# Patient Record
Sex: FEMALE | Race: WHITE | NOT HISPANIC OR LATINO | Employment: FULL TIME | URBAN - METROPOLITAN AREA
[De-identification: names, ages, dates, MRNs, and addresses within clinical notes are randomized per-mention and may not be internally consistent; named-entity substitution may affect disease eponyms.]

---

## 2017-09-29 ENCOUNTER — APPOINTMENT (OUTPATIENT)
Dept: WOMENS IMAGING | Facility: HOSPITAL | Age: 37
End: 2017-09-29

## 2017-09-29 PROCEDURE — 77067 SCR MAMMO BI INCL CAD: CPT | Performed by: RADIOLOGY

## 2017-09-29 PROCEDURE — 77063 BREAST TOMOSYNTHESIS BI: CPT | Performed by: RADIOLOGY

## 2019-02-15 ENCOUNTER — APPOINTMENT (OUTPATIENT)
Dept: WOMENS IMAGING | Facility: HOSPITAL | Age: 39
End: 2019-02-15

## 2019-02-15 PROCEDURE — 77067 SCR MAMMO BI INCL CAD: CPT | Performed by: RADIOLOGY

## 2019-02-15 PROCEDURE — 77063 BREAST TOMOSYNTHESIS BI: CPT | Performed by: RADIOLOGY

## 2020-06-05 ENCOUNTER — APPOINTMENT (OUTPATIENT)
Dept: WOMENS IMAGING | Facility: HOSPITAL | Age: 40
End: 2020-06-05

## 2020-06-05 PROCEDURE — 77066 DX MAMMO INCL CAD BI: CPT | Performed by: RADIOLOGY

## 2020-06-05 PROCEDURE — 76641 ULTRASOUND BREAST COMPLETE: CPT | Performed by: RADIOLOGY

## 2020-06-05 PROCEDURE — 77062 BREAST TOMOSYNTHESIS BI: CPT | Performed by: RADIOLOGY

## 2020-06-12 ENCOUNTER — APPOINTMENT (OUTPATIENT)
Dept: WOMENS IMAGING | Facility: HOSPITAL | Age: 40
End: 2020-06-12

## 2020-06-12 PROCEDURE — 19000 PUNCTURE ASPIR CYST BREAST: CPT | Performed by: RADIOLOGY

## 2020-06-12 PROCEDURE — 19083 BX BREAST 1ST LESION US IMAG: CPT | Performed by: RADIOLOGY

## 2020-06-12 PROCEDURE — 76942 ECHO GUIDE FOR BIOPSY: CPT

## 2020-06-18 DIAGNOSIS — C50.919 MALIGNANT NEOPLASM OF FEMALE BREAST, UNSPECIFIED ESTROGEN RECEPTOR STATUS, UNSPECIFIED LATERALITY, UNSPECIFIED SITE OF BREAST (HCC): Primary | ICD-10-CM

## 2020-06-19 ENCOUNTER — OFFICE VISIT (OUTPATIENT)
Dept: MAMMOGRAPHY | Facility: CLINIC | Age: 40
End: 2020-06-19

## 2020-06-19 ENCOUNTER — TRANSCRIBE ORDERS (OUTPATIENT)
Dept: MAMMOGRAPHY | Facility: CLINIC | Age: 40
End: 2020-06-19

## 2020-06-19 VITALS
WEIGHT: 156 LBS | DIASTOLIC BLOOD PRESSURE: 70 MMHG | HEIGHT: 66 IN | BODY MASS INDEX: 25.07 KG/M2 | SYSTOLIC BLOOD PRESSURE: 122 MMHG

## 2020-06-19 DIAGNOSIS — Z17.0 MALIGNANT NEOPLASM OF UPPER-OUTER QUADRANT OF LEFT BREAST IN FEMALE, ESTROGEN RECEPTOR POSITIVE (HCC): Primary | ICD-10-CM

## 2020-06-19 DIAGNOSIS — C50.412 MALIGNANT NEOPLASM OF UPPER-OUTER QUADRANT OF LEFT BREAST IN FEMALE, ESTROGEN RECEPTOR POSITIVE (HCC): Primary | ICD-10-CM

## 2020-06-19 PROCEDURE — 99205 OFFICE O/P NEW HI 60 MIN: CPT | Performed by: SURGERY

## 2020-06-19 RX ORDER — UBIDECARENONE 75 MG
50 CAPSULE ORAL DAILY
COMMUNITY
End: 2020-07-29

## 2020-06-19 RX ORDER — ERGOCALCIFEROL (VITAMIN D2) 10 MCG
400 TABLET ORAL DAILY
COMMUNITY
End: 2020-07-29

## 2020-06-19 RX ORDER — IBUPROFEN 400 MG/1
400 TABLET ORAL EVERY 6 HOURS PRN
COMMUNITY
End: 2020-08-07 | Stop reason: HOSPADM

## 2020-06-19 RX ORDER — CHLORAL HYDRATE 500 MG
1000 CAPSULE ORAL
COMMUNITY
End: 2020-08-07 | Stop reason: HOSPADM

## 2020-06-19 NOTE — PROGRESS NOTES
Chief Complaint: Tiera Grove is a 40 y.o.. female here today for Breast Cancer        History of Present Illness:  Patient presents with newly diagnosed breast cancer. Left IDC  She is a very nice 40-year-old white female who about 3 months ago noticed a mass in the upper outer quadrant of the left breast.  She has had some nodularity in this area and actually had a biopsy 6 years ago that was benign.  She was unable to get imaging during the COVID situation but eventually had a diagnostic mammogram performed in June.  There was an irregular mass measuring 22 mm in the posterior one third of the left breast at the 2 o'clock position.  There were also some stable punctate calcifications in the upper outer region of the left breast.  She then had ultrasound which revealed an irregular solid mass measuring 28 mm in the posterior one third of the left breast at the 2 o'clock position.  Note was also made of a small solid mass measuring 7 mm at the 3 o'clock position of the left breast.  The axilla was imaged and did not show any abnormalities.  Biopsy was recommended for the 2:00 and 3:00 masses.  At the 3 o'clock position she was found to have benign fibrocystic change.  The 2 o'clock position revealed grade 3 invasive ductal cancer with some focal associated high-grade DCIS.  The tumor was ER positive at 78%, WV positive at 98%, HER-2 negative, and the Ki-67 was 58%.    Her family history is significant for 2 maternal cousins with breast cancer in their 40s.  A maternal aunt also had breast cancer at 70.  She also has a paternal aunt who had breast cancer in her 60s.  There is a history of colon cancer in a maternal uncle and melanoma in a maternal aunt.  The patient does have a Mirena in place.    Review of Systems:  Review of Systems   Skin:        The patient denies any noticeable changes to the skin of the breast.    All other systems reviewed and are negative.     I have reviewed the ROS as documented by the  MA/LPN/RN iDrk Fenton MD      Past Medical and Surgical History:  Breast Biopsy History:  Patient has had the following breast biopsies:2014 Left-benign 2020 Left-malignant  Breast Cancer HIstory:  Patient does not have a past medical history of breast cancer.  Breast Operations, and year:  None  Social History     Tobacco Use   Smoking Status Never Smoker     Obstetric History:  Patient is premenopausal, first day of last period: Has an IUD and does not menstruate.   Number of pregnancies:2  Number of live births: 2  Number of abortions or miscarriages: 0  Age of delivery of first child: 22  Patient breast fed, for the following lenth of time: 5 weeks  Length of time taking birth control pills:5 years  Patient has never taken hormone replacement    Past Surgical History:   Procedure Laterality Date   • BREAST BIOPSY Left 2014    benign   • BREAST BIOPSY Left 06/2020    malignant       Past Medical History:   Diagnosis Date   • Breast cancer (CMS/HCC) 06/2020    Left IDC       Prior Hospitalizations, other than for surgery or childbirth, and year:  None    Social History:  Patient is .  Patient has one daughters. and Patient has one sons.    Family History:  Family History   Problem Relation Age of Onset   • Hearing loss Mother    • Hyperlipidemia Mother    • Alcohol abuse Father    • Deep vein thrombosis Father    • Hyperlipidemia Father    • Hypertension Father    • Melanoma Maternal Aunt    • Breast cancer Maternal Aunt    • Cancer Maternal Aunt         Adrenal glands   • Asthma Maternal Uncle    • Melanoma Maternal Uncle    • Colon cancer Maternal Uncle    • Breast cancer Paternal Aunt    • Heart failure Maternal Grandmother    • Depression Maternal Grandfather    • Heart failure Maternal Grandfather    • Birth defects Nephew    • Breast cancer Maternal Cousin    • Breast cancer Maternal Cousin        Vital Signs:  Vitals:    06/19/20 0905   BP: 122/70       Medications:    Current Outpatient  Prescriptions:     Current Outpatient Medications:   •  ibuprofen (ADVIL,MOTRIN) 400 MG tablet, Take 400 mg by mouth Every 6 (Six) Hours As Needed for Mild Pain ., Disp: , Rfl:   •  Omega-3 Fatty Acids (FISH OIL) 1000 MG capsule capsule, Take  by mouth Daily With Breakfast., Disp: , Rfl:   •  vitamin B-12 (CYANOCOBALAMIN) 100 MCG tablet, Take 50 mcg by mouth Daily., Disp: , Rfl:   •  Vitamin D, Cholecalciferol, (CHOLECALCIFEROL) 10 MCG (400 UNIT) tablet, Take 400 Units by mouth Daily., Disp: , Rfl:     Physical Examination:  General Appearance:   Patient is in no distress.  She is well kept and has an average build.   Psychiatric:  Patient with appropriate mood and affect. Alert and oriented to self, time, and place.    Breast, RIGHT:  small sized, symmetric with the contralateral side and tubular in shape.  Breast skin is without erythema, edema, rashes.  There are no visible abnormalities upon inspection during the arm-raising maneuver or with hands on hips in the sitting position. There is no nipple retraction, discharge or nipple/areolar skin changes.There are no masses palpable in the sitting or supine positions.    Breast, LEFT:  small sized, symmetric with the contralateral side and tubular in shape.  Breast skin is without erythema, edema, rashes.  There are no visible abnormalities upon inspection during the arm-raising maneuver or with hands on hips in the sitting position. There is no nipple retraction, discharge or nipple/areolar skin changes.There is an irregular mass in the upper outer quadrant which is very ill-defined but to my exam would appear to measure about 3.5 cm in greatest dimension.  It is mobile from the deeper structures.  She does have a small biopsy scar nearby.    Lymphatic:  There is  cervical, infraclavicular, or supraclavicular adenopathy bilaterally.  I can feel a mobile slightly firm left axillary lymph node which may be reactive.  Eyes:  Pupils are round and reactive to  light.  Cardiovascular:  Heart rate and rhythm are regular.  Respiratory:  Lungs are clear bilaterally with no crackles or wheezes in any lung field.  Gastrointestinal:  Abdomen is soft, nondistended, and nontender.  There was no obvious hepatosplenomegaly or abdominal mass.  There are no scars from previous surgery.    Musculoskeletal:  Good strength in all 4 extremities.   There is good range of motion in both shoulders.    Skin:  No new skin lesions or rashes on the skin excluding the breast (see breast exam above).    Cancer Staging  Primary Tumor: T2  Regional Lymph Nodes:  N0  Distant Mets: M0  Clinical Stage Group: IIA    Assessment:  1. Malignant neoplasm of upper-outer quadrant of left breast in female, estrogen receptor positive (CMS/HCC)          Plan:  She was accompanied today by her  and mother-in-law.  The office visit lasted 1 hour and 5 minutes with 50 minutes spent in face-to-face consultation.    We began the conversation discussing her pathology report.  We talked about the origin of most of breast cancers from either the ducts or the lobules.  The difference between invasive and in situ disease was explained and the visual was drawn for her.  When invasion has occurred, the lymph nodes need to be evaluated.  When there is in situ disease the lymph nodes should be considered if the area of concern is widespread or it is high-grade.  We also discussed the significance of hormone receptors.  They often can give us some idea how the breast cancer will behave and potentially lead us to offer neoadjuvant chemotherapy.    Next we discussed the surgical options which include breast conserving therapy versus a mastectomy.  With breast conserving therapy we are talking about a lumpectomy with margins, lymph node evaluation, and radiation treatment.  The radiation treatment is generally given to the entire breast for 6 weeks.  The side effects consist of local skin change and potential injury to  nearby structures such as the lung or the heart.  A mastectomy would involve removing the breast tissues with preservation of the pectoral muscles and evaluation of the lymph nodes if indicated.  The potential for reconstruction by either an implant or autologous tissue was discussed.  This could be performed on a delayed basis or immediately depending on a multitude of factors.  The survival rates for these 2 procedures are equivalent but there are incidences where one may be favored over the other.  There are times when a lumpectomy is not possible due to the large tumor to breast ratio or the location of the tumor.  Previous radiation to the chest wall or collagen disorders such as scleroderma would make radiation treatment and possible and therefore exclude breast conserving therapy as an option.    It is difficult to know exactly how big this tumor is.  To my examination is significantly bigger than the imaging studies would suggest.  For that reason I think an MRI would be helpful and we are in the process of setting that up.  She also is a candidate for genetic testing and we meghna the stat breast panel in the office today.  She is rather small breasted and I am fearful she will not be a very good lumpectomy candidate but of course we will await the MRI as well as the genetics report which could alter our decision making.  CPT coding:    Next Appointment:  No follow-ups on file.            EMR Dragon/transcription disclaimer:    Much of this encounter note is an electronic transcription/translocation of spoken language to printed text.  The electronic translation of spoken language may permit erroneous, or at times, nonsensical words or phrases to be inadvertently transcribed.  Although I have reviewed the note from such areas, some may still exist.

## 2020-06-23 ENCOUNTER — TELEPHONE (OUTPATIENT)
Dept: MAMMOGRAPHY | Facility: CLINIC | Age: 40
End: 2020-06-23

## 2020-06-23 NOTE — TELEPHONE ENCOUNTER
----- Message from Henrietta Yo MA sent at 6/22/2020  5:40 PM EDT -----  Regarding: FW: Visit Follow-Up Question  This came through her MyChart - she is having a little melt down, you may need to call her    Henrietta    ----- Message -----  From: Tiera Grove  Sent: 6/22/2020   4:31 PM EDT  To: [facility] Clinical Pool  Subject: Visit Follow-Up Question                         I have just been feeling anxious and would really like to get the cancer out of my body. I also have been just considering just going ahead and having a double mastectomy. Question is, are you awaiting the results from mri and genetic testing, only to help decide if the right one needs treatment as well, or do u need both of those tests before you do surgery on the left as well? I would like to get surgery scheduled ASAP and also I would really appreciate doing a PET scan to ensure the cancer hasn’t went anywhere else in my body.   You can call me or message me   [phone]   [email or web address]    I spoke with her today and she actually has decided upon bilateral mastectomies.  She would like to speak with the plastic surgeons and I have placed orders for that.

## 2020-06-23 NOTE — TELEPHONE ENCOUNTER
Henrietta, I spoke with her today and she would like to proceed with bilateral mastectomies.  The MRI is not probably as important in that setting but if it works out we will get it.  We need to get her to see the plastic surgeons and I have placed orders for that.

## 2020-06-23 NOTE — TELEPHONE ENCOUNTER
Ms. Grove called and is very concerned about waiting to have the MRI and Genetics that her surgery will spread. She would like to speak with you about just moving forward with a bilateral mastectomy with reconstruction.    I did review with her the names of the plastic surgeons we use and she was not familiar with any of them.    Please call her at [phone]

## 2020-06-24 ENCOUNTER — TELEPHONE (OUTPATIENT)
Dept: OTHER | Facility: HOSPITAL | Age: 40
End: 2020-06-24

## 2020-06-24 NOTE — TELEPHONE ENCOUNTER
Referral received from Dr. Fenton's office. I called Tiera and introduced myself and navigational services. She states the plan is to have a MRI and genetic testing prior to making a surgery decision. She did state however that she is leaning toward a bilateral mastectomy with reconstruction. She verbalizes her primary concern is waiting for surgery, but she understands the need to schedule testing and consults prior to surgery. She has a good understanding of her pathology and treatment options and  is comfortable with her plan of care.     We also discussed genetic testing and needing a consult with Dr. Nguyen for genetic counseling. She had verbal understanding and that appointment was made.     She stated she has a good support system at home and is doing well at this time. We discussed that we have counseling services available through our [facility] if she should need it in the future. She was thankful for the information and declined the need for that at present.    We discussed integrative therapies and other services at the [facility]. She verbalized interest in receiving a navigation folder outlining services. I verified her mailing address and will send out a navigation folder with the following information:     [facility],  Sharing Our Stories Breast Cancer Support Group, Cancer and Restorative Exercise (CARE), [employer] Exercise program, Together for Breast Cancer Survival,  For Women Facing Breast Cancer, [employer], [facility], Massage Therapy, Reiki Therapy, [employer] [place], Cancer Nutrition, and Survivorship Clinic.     She verbalized appreciation for navigational services and she has my contact information and will call with any questions that arise.

## 2020-06-29 ENCOUNTER — TELEPHONE (OUTPATIENT)
Dept: MAMMOGRAPHY | Facility: CLINIC | Age: 40
End: 2020-06-29

## 2020-06-29 NOTE — TELEPHONE ENCOUNTER
I told her the genetics testing was negative.  She has her plastic surgery consult and an MRI coming up on July 8.

## 2020-07-07 ENCOUNTER — APPOINTMENT (OUTPATIENT)
Dept: MRI IMAGING | Facility: HOSPITAL | Age: 40
End: 2020-07-07

## 2020-07-08 ENCOUNTER — HOSPITAL ENCOUNTER (OUTPATIENT)
Dept: MRI IMAGING | Facility: HOSPITAL | Age: 40
Discharge: HOME | End: 2020-07-08
Admitting: SURGERY

## 2020-07-08 DIAGNOSIS — C50.412 MALIGNANT NEOPLASM OF UPPER-OUTER QUADRANT OF LEFT BREAST IN FEMALE, ESTROGEN RECEPTOR POSITIVE (HCC): ICD-10-CM

## 2020-07-08 DIAGNOSIS — Z17.0 MALIGNANT NEOPLASM OF UPPER-OUTER QUADRANT OF LEFT BREAST IN FEMALE, ESTROGEN RECEPTOR POSITIVE (HCC): ICD-10-CM

## 2020-07-08 PROCEDURE — A9577 INJ MULTIHANCE: HCPCS | Performed by: SURGERY

## 2020-07-08 PROCEDURE — 77049 MRI BREAST C-+ W/CAD BI: CPT

## 2020-07-08 PROCEDURE — 0 GADOBENATE DIMEGLUMINE 529 MG/ML SOLUTION: Performed by: SURGERY

## 2020-07-08 RX ADMIN — GADOBENATE DIMEGLUMINE 15 ML: 529 INJECTION, SOLUTION INTRAVENOUS at 18:30

## 2020-07-10 ENCOUNTER — TELEPHONE (OUTPATIENT)
Dept: MAMMOGRAPHY | Facility: CLINIC | Age: 40
End: 2020-07-10

## 2020-07-10 NOTE — TELEPHONE ENCOUNTER
I spoke with her today regarding her MRI.  There does appear to be some encroachment on the pectoral fascia and may be a tiny area of pectoralis involvement.  She would probably be better off with the implant underneath the muscle and I will speak with the plastic surgeons regarding that.

## 2020-07-17 ENCOUNTER — PREP FOR SURGERY (OUTPATIENT)
Dept: OTHER | Facility: HOSPITAL | Age: 40
End: 2020-07-17

## 2020-07-17 DIAGNOSIS — C50.412 MALIGNANT NEOPLASM OF UPPER-OUTER QUADRANT OF LEFT BREAST IN FEMALE, ESTROGEN RECEPTOR POSITIVE (HCC): Primary | ICD-10-CM

## 2020-07-17 DIAGNOSIS — Z17.0 MALIGNANT NEOPLASM OF UPPER-OUTER QUADRANT OF LEFT BREAST IN FEMALE, ESTROGEN RECEPTOR POSITIVE (HCC): Primary | ICD-10-CM

## 2020-07-17 RX ORDER — CEFAZOLIN SODIUM 2 G/100ML
2 INJECTION, SOLUTION INTRAVENOUS ONCE
Status: CANCELLED | OUTPATIENT
Start: 2020-08-06 | End: 2020-07-17

## 2020-07-17 RX ORDER — DIAZEPAM 5 MG/1
10 TABLET ORAL ONCE
Status: CANCELLED | OUTPATIENT
Start: 2020-08-06 | End: 2020-07-17

## 2020-07-17 RX ORDER — CELECOXIB 200 MG/1
400 CAPSULE ORAL ONCE
Status: CANCELLED | OUTPATIENT
Start: 2020-08-06 | End: 2020-07-17

## 2020-07-17 RX ORDER — ACETAMINOPHEN 500 MG
1000 TABLET ORAL ONCE
Status: CANCELLED | OUTPATIENT
Start: 2020-08-06 | End: 2020-07-17

## 2020-07-17 RX ORDER — LIDOCAINE AND PRILOCAINE 25; 25 MG/G; MG/G
CREAM TOPICAL ONCE
Status: CANCELLED | OUTPATIENT
Start: 2020-08-06 | End: 2020-07-17

## 2020-07-20 ENCOUNTER — TELEPHONE (OUTPATIENT)
Dept: MAMMOGRAPHY | Facility: CLINIC | Age: 40
End: 2020-07-20

## 2020-07-20 PROBLEM — Z17.0 MALIGNANT NEOPLASM OF UPPER-OUTER QUADRANT OF LEFT BREAST IN FEMALE, ESTROGEN RECEPTOR POSITIVE: Status: ACTIVE | Noted: 2020-07-20

## 2020-07-20 PROBLEM — C50.412 MALIGNANT NEOPLASM OF UPPER-OUTER QUADRANT OF LEFT BREAST IN FEMALE, ESTROGEN RECEPTOR POSITIVE (HCC): Status: ACTIVE | Noted: 2020-07-20

## 2020-07-20 NOTE — TELEPHONE ENCOUNTER
Left voicemail for patient. Mailed and scanned in [facility] patient surgery information sheet.     PAT is scheduled for 7/29 at 1 pm -  Requested patient call with questions

## 2020-07-27 ENCOUNTER — TRANSCRIBE ORDERS (OUTPATIENT)
Dept: PREADMISSION TESTING | Facility: HOSPITAL | Age: 40
End: 2020-07-27

## 2020-07-27 DIAGNOSIS — Z01.818 OTHER SPECIFIED PRE-OPERATIVE EXAMINATION: Primary | ICD-10-CM

## 2020-07-29 ENCOUNTER — APPOINTMENT (OUTPATIENT)
Dept: PREADMISSION TESTING | Facility: HOSPITAL | Age: 40
End: 2020-07-29

## 2020-07-29 VITALS
SYSTOLIC BLOOD PRESSURE: 112 MMHG | DIASTOLIC BLOOD PRESSURE: 72 MMHG | WEIGHT: 161.5 LBS | OXYGEN SATURATION: 100 % | RESPIRATION RATE: 16 BRPM | HEART RATE: 75 BPM | BODY MASS INDEX: 25.96 KG/M2 | TEMPERATURE: 97.4 F | HEIGHT: 66 IN

## 2020-07-29 DIAGNOSIS — Z17.0 MALIGNANT NEOPLASM OF UPPER-OUTER QUADRANT OF LEFT BREAST IN FEMALE, ESTROGEN RECEPTOR POSITIVE (HCC): ICD-10-CM

## 2020-07-29 DIAGNOSIS — C50.412 MALIGNANT NEOPLASM OF UPPER-OUTER QUADRANT OF LEFT BREAST IN FEMALE, ESTROGEN RECEPTOR POSITIVE (HCC): ICD-10-CM

## 2020-07-29 LAB
ALBUMIN SERPL-MCNC: 4.2 G/DL (ref 3.5–5.2)
ALBUMIN/GLOB SERPL: 1.5 G/DL
ALP SERPL-CCNC: 73 U/L (ref 39–117)
ALT SERPL W P-5'-P-CCNC: 20 U/L (ref 1–33)
ANION GAP SERPL CALCULATED.3IONS-SCNC: 7.4 MMOL/L (ref 5–15)
AST SERPL-CCNC: 18 U/L (ref 1–32)
BILIRUB SERPL-MCNC: 0.3 MG/DL (ref 0–1.2)
BUN SERPL-MCNC: 9 MG/DL (ref 6–20)
BUN/CREAT SERPL: 14.3 (ref 7–25)
CALCIUM SPEC-SCNC: 9.5 MG/DL (ref 8.6–10.5)
CHLORIDE SERPL-SCNC: 97 MMOL/L (ref 98–107)
CO2 SERPL-SCNC: 28.6 MMOL/L (ref 22–29)
CREAT SERPL-MCNC: 0.63 MG/DL (ref 0.57–1)
DEPRECATED RDW RBC AUTO: 42.1 FL (ref 37–54)
ERYTHROCYTE [DISTWIDTH] IN BLOOD BY AUTOMATED COUNT: 12 % (ref 12.3–15.4)
GFR SERPL CREATININE-BSD FRML MDRD: 104 ML/MIN/1.73
GLOBULIN UR ELPH-MCNC: 2.8 GM/DL
GLUCOSE SERPL-MCNC: 93 MG/DL (ref 65–99)
HCT VFR BLD AUTO: 40 % (ref 34–46.6)
HGB BLD-MCNC: 13.2 G/DL (ref 12–15.9)
MCH RBC QN AUTO: 31.3 PG (ref 26.6–33)
MCHC RBC AUTO-ENTMCNC: 33 G/DL (ref 31.5–35.7)
MCV RBC AUTO: 94.8 FL (ref 79–97)
PLATELET # BLD AUTO: 353 10*3/MM3 (ref 140–450)
PMV BLD AUTO: 9.2 FL (ref 6–12)
POTASSIUM SERPL-SCNC: 4 MMOL/L (ref 3.5–5.2)
PROT SERPL-MCNC: 7 G/DL (ref 6–8.5)
RBC # BLD AUTO: 4.22 10*6/MM3 (ref 3.77–5.28)
SODIUM SERPL-SCNC: 133 MMOL/L (ref 136–145)
WBC # BLD AUTO: 8.37 10*3/MM3 (ref 3.4–10.8)

## 2020-07-29 PROCEDURE — 80053 COMPREHEN METABOLIC PANEL: CPT | Performed by: SURGERY

## 2020-07-29 PROCEDURE — 85027 COMPLETE CBC AUTOMATED: CPT | Performed by: SURGERY

## 2020-07-29 PROCEDURE — 36415 COLL VENOUS BLD VENIPUNCTURE: CPT

## 2020-07-29 RX ORDER — LANOLIN ALCOHOL/MO/W.PET/CERES
1000 CREAM (GRAM) TOPICAL DAILY
COMMUNITY
End: 2020-09-08

## 2020-07-29 RX ORDER — ERGOCALCIFEROL 1.25 MG/1
50000 CAPSULE ORAL WEEKLY
COMMUNITY
End: 2021-05-07

## 2020-07-30 ENCOUNTER — TELEPHONE (OUTPATIENT)
Dept: MAMMOGRAPHY | Facility: CLINIC | Age: 40
End: 2020-07-30

## 2020-07-30 NOTE — TELEPHONE ENCOUNTER
Tiera Bah called to request her  spend the night with her at the hospital. I explained that would be decided by the hospital - she spoke with them and they told her if you requested it through her chart they could allow it and send it to risk management.    She would like you to do put a request / order in stating her  should be allowed to stay with her overnight. Please advise

## 2020-07-31 NOTE — TELEPHONE ENCOUNTER
This request has been approved through RISK management.  Pts  can have an overnight stay with the pt due to travel needs. [facility] [address] notified as well as screening booths.  Pt notified.     CMA

## 2020-08-03 DIAGNOSIS — Z17.0 MALIGNANT NEOPLASM OF UPPER-OUTER QUADRANT OF LEFT BREAST IN FEMALE, ESTROGEN RECEPTOR POSITIVE (HCC): Primary | ICD-10-CM

## 2020-08-03 DIAGNOSIS — C50.412 MALIGNANT NEOPLASM OF UPPER-OUTER QUADRANT OF LEFT BREAST IN FEMALE, ESTROGEN RECEPTOR POSITIVE (HCC): Primary | ICD-10-CM

## 2020-08-04 ENCOUNTER — LAB (OUTPATIENT)
Dept: LAB | Facility: HOSPITAL | Age: 40
End: 2020-08-04

## 2020-08-04 ENCOUNTER — APPOINTMENT (OUTPATIENT)
Dept: LAB | Facility: HOSPITAL | Age: 40
End: 2020-08-04

## 2020-08-04 DIAGNOSIS — Z01.818 OTHER SPECIFIED PRE-OPERATIVE EXAMINATION: ICD-10-CM

## 2020-08-04 PROCEDURE — C9803 HOPD COVID-19 SPEC COLLECT: HCPCS

## 2020-08-04 PROCEDURE — U0004 COV-19 TEST NON-CDC HGH THRU: HCPCS

## 2020-08-05 LAB
REF LAB TEST METHOD: NORMAL
SARS-COV-2 RNA RESP QL NAA+PROBE: NOT DETECTED

## 2020-08-06 ENCOUNTER — HOSPITAL ENCOUNTER (OUTPATIENT)
Dept: NUCLEAR MEDICINE | Facility: HOSPITAL | Age: 40
Discharge: HOME | End: 2020-08-06

## 2020-08-06 ENCOUNTER — HOSPITAL ENCOUNTER (OUTPATIENT)
Facility: HOSPITAL | Age: 40
Discharge: HOME | End: 2020-08-07
Attending: SURGERY | Admitting: SURGERY

## 2020-08-06 ENCOUNTER — ANESTHESIA EVENT (OUTPATIENT)
Dept: PERIOP | Facility: HOSPITAL | Age: 40
End: 2020-08-06

## 2020-08-06 ENCOUNTER — ANESTHESIA (OUTPATIENT)
Dept: PERIOP | Facility: HOSPITAL | Age: 40
End: 2020-08-06

## 2020-08-06 DIAGNOSIS — Z17.0 MALIGNANT NEOPLASM OF UPPER-OUTER QUADRANT OF LEFT BREAST IN FEMALE, ESTROGEN RECEPTOR POSITIVE (HCC): ICD-10-CM

## 2020-08-06 DIAGNOSIS — C50.412 MALIGNANT NEOPLASM OF UPPER-OUTER QUADRANT OF LEFT BREAST IN FEMALE, ESTROGEN RECEPTOR POSITIVE (HCC): ICD-10-CM

## 2020-08-06 LAB
B-HCG UR QL: NEGATIVE
INTERNAL NEGATIVE CONTROL: NEGATIVE
INTERNAL POSITIVE CONTROL: POSITIVE
Lab: NORMAL

## 2020-08-06 PROCEDURE — 38900 IO MAP OF SENT LYMPH NODE: CPT | Performed by: SURGERY

## 2020-08-06 PROCEDURE — 88331 PATH CONSLTJ SURG 1 BLK 1SPC: CPT | Performed by: SURGERY

## 2020-08-06 PROCEDURE — G0378 HOSPITAL OBSERVATION PER HR: HCPCS

## 2020-08-06 PROCEDURE — 25010000002 DEXAMETHASONE PER 1 MG: Performed by: NURSE ANESTHETIST, CERTIFIED REGISTERED

## 2020-08-06 PROCEDURE — 88302 TISSUE EXAM BY PATHOLOGIST: CPT | Performed by: SURGERY

## 2020-08-06 PROCEDURE — 0 TECHNETIUM FILTERED SULFUR COLLOID: Performed by: SURGERY

## 2020-08-06 PROCEDURE — 63710000001 ONDANSETRON PER 8 MG: Performed by: PLASTIC SURGERY

## 2020-08-06 PROCEDURE — 88307 TISSUE EXAM BY PATHOLOGIST: CPT | Performed by: SURGERY

## 2020-08-06 PROCEDURE — 25010000002 PHENYLEPHRINE PER 1 ML: Performed by: NURSE ANESTHETIST, CERTIFIED REGISTERED

## 2020-08-06 PROCEDURE — 19307 MAST MOD RAD: CPT | Performed by: REGISTERED NURSE

## 2020-08-06 PROCEDURE — 25010000003 CEFAZOLIN IN DEXTROSE 2-4 GM/100ML-% SOLUTION: Performed by: SURGERY

## 2020-08-06 PROCEDURE — 88332 PATH CONSLTJ SURG EA ADD BLK: CPT | Performed by: SURGERY

## 2020-08-06 PROCEDURE — 19307 MAST MOD RAD: CPT | Performed by: SURGERY

## 2020-08-06 PROCEDURE — C1789 PROSTHESIS, BREAST, IMP: HCPCS | Performed by: SURGERY

## 2020-08-06 PROCEDURE — 25010000002 GENTAMICIN PER 80 MG: Performed by: PLASTIC SURGERY

## 2020-08-06 PROCEDURE — 19303 MAST SIMPLE COMPLETE: CPT | Performed by: REGISTERED NURSE

## 2020-08-06 PROCEDURE — 25010000003 CEFAZOLIN PER 500 MG: Performed by: PLASTIC SURGERY

## 2020-08-06 PROCEDURE — 81025 URINE PREGNANCY TEST: CPT | Performed by: ANESTHESIOLOGY

## 2020-08-06 PROCEDURE — 25010000002 PROPOFOL 10 MG/ML EMULSION: Performed by: NURSE ANESTHETIST, CERTIFIED REGISTERED

## 2020-08-06 PROCEDURE — 19303 MAST SIMPLE COMPLETE: CPT | Performed by: SURGERY

## 2020-08-06 PROCEDURE — A9541 TC99M SULFUR COLLOID: HCPCS | Performed by: SURGERY

## 2020-08-06 PROCEDURE — 38792 RA TRACER ID OF SENTINL NODE: CPT

## 2020-08-06 PROCEDURE — 25010000002 FENTANYL CITRATE (PF) 100 MCG/2ML SOLUTION: Performed by: ANESTHESIOLOGY

## 2020-08-06 PROCEDURE — 94799 UNLISTED PULMONARY SVC/PX: CPT

## 2020-08-06 PROCEDURE — 25010000002 ROPIVACAINE PER 1 MG: Performed by: PLASTIC SURGERY

## 2020-08-06 DEVICE — GRFT TISS ALLODERM RTM PERF MD 9.6X19.3CM: Type: IMPLANTABLE DEVICE | Site: BREAST | Status: FUNCTIONAL

## 2020-08-06 DEVICE — IMPLANTABLE DEVICE: Type: IMPLANTABLE DEVICE | Site: BREAST | Status: FUNCTIONAL

## 2020-08-06 RX ORDER — SODIUM CHLORIDE 0.9 % (FLUSH) 0.9 %
3-10 SYRINGE (ML) INJECTION AS NEEDED
Status: DISCONTINUED | OUTPATIENT
Start: 2020-08-06 | End: 2020-08-06 | Stop reason: HOSPADM

## 2020-08-06 RX ORDER — FLUMAZENIL 0.1 MG/ML
0.2 INJECTION INTRAVENOUS AS NEEDED
Status: DISCONTINUED | OUTPATIENT
Start: 2020-08-06 | End: 2020-08-06 | Stop reason: HOSPADM

## 2020-08-06 RX ORDER — CEFAZOLIN SODIUM 2 G/100ML
2 INJECTION, SOLUTION INTRAVENOUS ONCE
Status: COMPLETED | OUTPATIENT
Start: 2020-08-06 | End: 2020-08-06

## 2020-08-06 RX ORDER — BISACODYL 10 MG
10 SUPPOSITORY, RECTAL RECTAL DAILY PRN
Status: DISCONTINUED | OUTPATIENT
Start: 2020-08-06 | End: 2020-08-07 | Stop reason: HOSPADM

## 2020-08-06 RX ORDER — OXYCODONE HYDROCHLORIDE 5 MG/1
10 TABLET ORAL ONCE
Status: COMPLETED | OUTPATIENT
Start: 2020-08-06 | End: 2020-08-06

## 2020-08-06 RX ORDER — PROMETHAZINE HYDROCHLORIDE 25 MG/ML
12.5 INJECTION, SOLUTION INTRAMUSCULAR; INTRAVENOUS EVERY 6 HOURS PRN
Status: DISCONTINUED | OUTPATIENT
Start: 2020-08-06 | End: 2020-08-07 | Stop reason: HOSPADM

## 2020-08-06 RX ORDER — ACETAMINOPHEN 500 MG
500 TABLET ORAL EVERY 6 HOURS PRN
COMMUNITY
End: 2020-08-07 | Stop reason: HOSPADM

## 2020-08-06 RX ORDER — HYDROCODONE BITARTRATE AND ACETAMINOPHEN 10; 325 MG/1; MG/1
1 TABLET ORAL EVERY 4 HOURS PRN
Status: DISCONTINUED | OUTPATIENT
Start: 2020-08-06 | End: 2020-08-07 | Stop reason: HOSPADM

## 2020-08-06 RX ORDER — HYDROCODONE BITARTRATE AND ACETAMINOPHEN 5; 325 MG/1; MG/1
1 TABLET ORAL EVERY 4 HOURS PRN
Status: DISCONTINUED | OUTPATIENT
Start: 2020-08-06 | End: 2020-08-07 | Stop reason: HOSPADM

## 2020-08-06 RX ORDER — ONDANSETRON 4 MG/1
4 TABLET, FILM COATED ORAL EVERY 6 HOURS PRN
Status: DISCONTINUED | OUTPATIENT
Start: 2020-08-06 | End: 2020-08-07 | Stop reason: HOSPADM

## 2020-08-06 RX ORDER — SODIUM CHLORIDE 0.9 % (FLUSH) 0.9 %
3 SYRINGE (ML) INJECTION EVERY 12 HOURS SCHEDULED
Status: DISCONTINUED | OUTPATIENT
Start: 2020-08-06 | End: 2020-08-06 | Stop reason: HOSPADM

## 2020-08-06 RX ORDER — HYDROMORPHONE HYDROCHLORIDE 1 MG/ML
0.5 INJECTION, SOLUTION INTRAMUSCULAR; INTRAVENOUS; SUBCUTANEOUS
Status: DISCONTINUED | OUTPATIENT
Start: 2020-08-06 | End: 2020-08-07 | Stop reason: HOSPADM

## 2020-08-06 RX ORDER — SODIUM CHLORIDE, SODIUM LACTATE, POTASSIUM CHLORIDE, CALCIUM CHLORIDE 600; 310; 30; 20 MG/100ML; MG/100ML; MG/100ML; MG/100ML
9 INJECTION, SOLUTION INTRAVENOUS CONTINUOUS
Status: DISCONTINUED | OUTPATIENT
Start: 2020-08-06 | End: 2020-08-06

## 2020-08-06 RX ORDER — LIDOCAINE HYDROCHLORIDE 20 MG/ML
INJECTION, SOLUTION INFILTRATION; PERINEURAL AS NEEDED
Status: DISCONTINUED | OUTPATIENT
Start: 2020-08-06 | End: 2020-08-06 | Stop reason: SURG

## 2020-08-06 RX ORDER — CELECOXIB 200 MG/1
400 CAPSULE ORAL ONCE
Status: DISCONTINUED | OUTPATIENT
Start: 2020-08-06 | End: 2020-08-06

## 2020-08-06 RX ORDER — GABAPENTIN 100 MG/1
100 CAPSULE ORAL EVERY 8 HOURS SCHEDULED
Status: DISCONTINUED | OUTPATIENT
Start: 2020-08-06 | End: 2020-08-07 | Stop reason: HOSPADM

## 2020-08-06 RX ORDER — SODIUM CHLORIDE 0.9 % (FLUSH) 0.9 %
3-10 SYRINGE (ML) INJECTION AS NEEDED
Status: DISCONTINUED | OUTPATIENT
Start: 2020-08-06 | End: 2020-08-07 | Stop reason: HOSPADM

## 2020-08-06 RX ORDER — NALOXONE HCL 0.4 MG/ML
0.2 VIAL (ML) INJECTION AS NEEDED
Status: DISCONTINUED | OUTPATIENT
Start: 2020-08-06 | End: 2020-08-06 | Stop reason: HOSPADM

## 2020-08-06 RX ORDER — ONDANSETRON 2 MG/ML
4 INJECTION INTRAMUSCULAR; INTRAVENOUS ONCE AS NEEDED
Status: DISCONTINUED | OUTPATIENT
Start: 2020-08-06 | End: 2020-08-06 | Stop reason: HOSPADM

## 2020-08-06 RX ORDER — FAMOTIDINE 10 MG/ML
20 INJECTION, SOLUTION INTRAVENOUS ONCE
Status: COMPLETED | OUTPATIENT
Start: 2020-08-06 | End: 2020-08-06

## 2020-08-06 RX ORDER — FENTANYL CITRATE 50 UG/ML
50 INJECTION, SOLUTION INTRAMUSCULAR; INTRAVENOUS
Status: COMPLETED | OUTPATIENT
Start: 2020-08-06 | End: 2020-08-06

## 2020-08-06 RX ORDER — ACETAMINOPHEN 500 MG
1000 TABLET ORAL ONCE
Status: DISCONTINUED | OUTPATIENT
Start: 2020-08-06 | End: 2020-08-06

## 2020-08-06 RX ORDER — PROMETHAZINE HYDROCHLORIDE 25 MG/1
25 TABLET ORAL ONCE AS NEEDED
Status: DISCONTINUED | OUTPATIENT
Start: 2020-08-06 | End: 2020-08-06 | Stop reason: HOSPADM

## 2020-08-06 RX ORDER — ONDANSETRON 2 MG/ML
4 INJECTION INTRAMUSCULAR; INTRAVENOUS EVERY 6 HOURS PRN
Status: DISCONTINUED | OUTPATIENT
Start: 2020-08-06 | End: 2020-08-07 | Stop reason: HOSPADM

## 2020-08-06 RX ORDER — EPHEDRINE SULFATE 50 MG/ML
5 INJECTION, SOLUTION INTRAVENOUS ONCE AS NEEDED
Status: DISCONTINUED | OUTPATIENT
Start: 2020-08-06 | End: 2020-08-06 | Stop reason: HOSPADM

## 2020-08-06 RX ORDER — OXYCODONE HYDROCHLORIDE 5 MG/1
5 TABLET ORAL EVERY 4 HOURS PRN
Status: DISCONTINUED | OUTPATIENT
Start: 2020-08-06 | End: 2020-08-07 | Stop reason: HOSPADM

## 2020-08-06 RX ORDER — LIDOCAINE HYDROCHLORIDE 10 MG/ML
0.5 INJECTION, SOLUTION EPIDURAL; INFILTRATION; INTRACAUDAL; PERINEURAL ONCE AS NEEDED
Status: DISCONTINUED | OUTPATIENT
Start: 2020-08-06 | End: 2020-08-06 | Stop reason: HOSPADM

## 2020-08-06 RX ORDER — GABAPENTIN 300 MG/1
300 CAPSULE ORAL ONCE
Status: COMPLETED | OUTPATIENT
Start: 2020-08-06 | End: 2020-08-06

## 2020-08-06 RX ORDER — DOXYCYCLINE 100 MG/1
100 CAPSULE ORAL EVERY 12 HOURS SCHEDULED
Status: DISCONTINUED | OUTPATIENT
Start: 2020-08-06 | End: 2020-08-07 | Stop reason: HOSPADM

## 2020-08-06 RX ORDER — PROMETHAZINE HYDROCHLORIDE 25 MG/1
25 SUPPOSITORY RECTAL ONCE AS NEEDED
Status: DISCONTINUED | OUTPATIENT
Start: 2020-08-06 | End: 2020-08-06 | Stop reason: HOSPADM

## 2020-08-06 RX ORDER — ONDANSETRON HYDROCHLORIDE 8 MG/1
8 TABLET, FILM COATED ORAL ONCE
Status: COMPLETED | OUTPATIENT
Start: 2020-08-06 | End: 2020-08-06

## 2020-08-06 RX ORDER — DIAZEPAM 5 MG/1
10 TABLET ORAL ONCE
Status: COMPLETED | OUTPATIENT
Start: 2020-08-06 | End: 2020-08-06

## 2020-08-06 RX ORDER — PROMETHAZINE HYDROCHLORIDE 25 MG/ML
6.25 INJECTION, SOLUTION INTRAMUSCULAR; INTRAVENOUS
Status: DISCONTINUED | OUTPATIENT
Start: 2020-08-06 | End: 2020-08-06 | Stop reason: HOSPADM

## 2020-08-06 RX ORDER — DIPHENHYDRAMINE HCL 25 MG
25 CAPSULE ORAL
Status: DISCONTINUED | OUTPATIENT
Start: 2020-08-06 | End: 2020-08-06 | Stop reason: HOSPADM

## 2020-08-06 RX ORDER — ROCURONIUM BROMIDE 10 MG/ML
INJECTION, SOLUTION INTRAVENOUS AS NEEDED
Status: DISCONTINUED | OUTPATIENT
Start: 2020-08-06 | End: 2020-08-06 | Stop reason: SURG

## 2020-08-06 RX ORDER — EPHEDRINE SULFATE 50 MG/ML
INJECTION, SOLUTION INTRAVENOUS AS NEEDED
Status: DISCONTINUED | OUTPATIENT
Start: 2020-08-06 | End: 2020-08-06 | Stop reason: SURG

## 2020-08-06 RX ORDER — LABETALOL HYDROCHLORIDE 5 MG/ML
5 INJECTION, SOLUTION INTRAVENOUS
Status: DISCONTINUED | OUTPATIENT
Start: 2020-08-06 | End: 2020-08-06 | Stop reason: HOSPADM

## 2020-08-06 RX ORDER — ACETAMINOPHEN 500 MG
1000 TABLET ORAL ONCE
Status: COMPLETED | OUTPATIENT
Start: 2020-08-06 | End: 2020-08-06

## 2020-08-06 RX ORDER — NALOXONE HCL 0.4 MG/ML
0.4 VIAL (ML) INJECTION
Status: DISCONTINUED | OUTPATIENT
Start: 2020-08-06 | End: 2020-08-07 | Stop reason: HOSPADM

## 2020-08-06 RX ORDER — DEXAMETHASONE SODIUM PHOSPHATE 10 MG/ML
INJECTION INTRAMUSCULAR; INTRAVENOUS AS NEEDED
Status: DISCONTINUED | OUTPATIENT
Start: 2020-08-06 | End: 2020-08-06 | Stop reason: SURG

## 2020-08-06 RX ORDER — PROPOFOL 10 MG/ML
VIAL (ML) INTRAVENOUS AS NEEDED
Status: DISCONTINUED | OUTPATIENT
Start: 2020-08-06 | End: 2020-08-06 | Stop reason: SURG

## 2020-08-06 RX ORDER — DOCUSATE SODIUM 100 MG/1
100 CAPSULE, LIQUID FILLED ORAL 2 TIMES DAILY PRN
Status: DISCONTINUED | OUTPATIENT
Start: 2020-08-06 | End: 2020-08-07 | Stop reason: HOSPADM

## 2020-08-06 RX ORDER — ACETAMINOPHEN 325 MG/1
650 TABLET ORAL EVERY 4 HOURS PRN
Status: DISCONTINUED | OUTPATIENT
Start: 2020-08-06 | End: 2020-08-07 | Stop reason: HOSPADM

## 2020-08-06 RX ORDER — PROMETHAZINE HYDROCHLORIDE 25 MG/ML
12.5 INJECTION, SOLUTION INTRAMUSCULAR; INTRAVENOUS ONCE AS NEEDED
Status: DISCONTINUED | OUTPATIENT
Start: 2020-08-06 | End: 2020-08-06 | Stop reason: HOSPADM

## 2020-08-06 RX ORDER — HYDROMORPHONE HYDROCHLORIDE 1 MG/ML
0.5 INJECTION, SOLUTION INTRAMUSCULAR; INTRAVENOUS; SUBCUTANEOUS
Status: DISCONTINUED | OUTPATIENT
Start: 2020-08-06 | End: 2020-08-06 | Stop reason: HOSPADM

## 2020-08-06 RX ORDER — ACETAMINOPHEN 650 MG/1
650 SUPPOSITORY RECTAL EVERY 4 HOURS PRN
Status: DISCONTINUED | OUTPATIENT
Start: 2020-08-06 | End: 2020-08-07 | Stop reason: HOSPADM

## 2020-08-06 RX ORDER — MAGNESIUM HYDROXIDE 1200 MG/15ML
LIQUID ORAL AS NEEDED
Status: DISCONTINUED | OUTPATIENT
Start: 2020-08-06 | End: 2020-08-06 | Stop reason: HOSPADM

## 2020-08-06 RX ORDER — FENTANYL CITRATE 50 UG/ML
50 INJECTION, SOLUTION INTRAMUSCULAR; INTRAVENOUS
Status: DISCONTINUED | OUTPATIENT
Start: 2020-08-06 | End: 2020-08-06 | Stop reason: HOSPADM

## 2020-08-06 RX ORDER — SODIUM CHLORIDE, SODIUM LACTATE, POTASSIUM CHLORIDE, CALCIUM CHLORIDE 600; 310; 30; 20 MG/100ML; MG/100ML; MG/100ML; MG/100ML
125 INJECTION, SOLUTION INTRAVENOUS CONTINUOUS
Status: DISCONTINUED | OUTPATIENT
Start: 2020-08-06 | End: 2020-08-06

## 2020-08-06 RX ORDER — HYDROCODONE BITARTRATE AND ACETAMINOPHEN 7.5; 325 MG/1; MG/1
1 TABLET ORAL ONCE AS NEEDED
Status: DISCONTINUED | OUTPATIENT
Start: 2020-08-06 | End: 2020-08-06 | Stop reason: HOSPADM

## 2020-08-06 RX ORDER — ACETAMINOPHEN 325 MG/1
650 TABLET ORAL ONCE AS NEEDED
Status: DISCONTINUED | OUTPATIENT
Start: 2020-08-06 | End: 2020-08-06 | Stop reason: HOSPADM

## 2020-08-06 RX ORDER — DEXMEDETOMIDINE HYDROCHLORIDE 100 UG/ML
INJECTION, SOLUTION INTRAVENOUS AS NEEDED
Status: DISCONTINUED | OUTPATIENT
Start: 2020-08-06 | End: 2020-08-06 | Stop reason: SURG

## 2020-08-06 RX ORDER — MORPHINE SULFATE 2 MG/ML
4 INJECTION, SOLUTION INTRAMUSCULAR; INTRAVENOUS
Status: DISCONTINUED | OUTPATIENT
Start: 2020-08-06 | End: 2020-08-07 | Stop reason: HOSPADM

## 2020-08-06 RX ORDER — DEXMEDETOMIDINE HYDROCHLORIDE 4 UG/ML
INJECTION INTRAVENOUS CONTINUOUS PRN
Status: DISCONTINUED | OUTPATIENT
Start: 2020-08-06 | End: 2020-08-06 | Stop reason: SURG

## 2020-08-06 RX ORDER — NALOXONE HCL 0.4 MG/ML
0.1 VIAL (ML) INJECTION
Status: DISCONTINUED | OUTPATIENT
Start: 2020-08-06 | End: 2020-08-06 | Stop reason: SDUPTHER

## 2020-08-06 RX ORDER — DIPHENHYDRAMINE HYDROCHLORIDE 50 MG/ML
12.5 INJECTION INTRAMUSCULAR; INTRAVENOUS
Status: DISCONTINUED | OUTPATIENT
Start: 2020-08-06 | End: 2020-08-06 | Stop reason: HOSPADM

## 2020-08-06 RX ORDER — MEPERIDINE HYDROCHLORIDE 25 MG/ML
12.5 INJECTION INTRAMUSCULAR; INTRAVENOUS; SUBCUTANEOUS
Status: DISCONTINUED | OUTPATIENT
Start: 2020-08-06 | End: 2020-08-06 | Stop reason: HOSPADM

## 2020-08-06 RX ORDER — OXYCODONE AND ACETAMINOPHEN 7.5; 325 MG/1; MG/1
1 TABLET ORAL ONCE AS NEEDED
Status: DISCONTINUED | OUTPATIENT
Start: 2020-08-06 | End: 2020-08-06 | Stop reason: HOSPADM

## 2020-08-06 RX ORDER — SODIUM CHLORIDE 0.9 % (FLUSH) 0.9 %
3 SYRINGE (ML) INJECTION EVERY 12 HOURS SCHEDULED
Status: DISCONTINUED | OUTPATIENT
Start: 2020-08-06 | End: 2020-08-07 | Stop reason: HOSPADM

## 2020-08-06 RX ORDER — HYDRALAZINE HYDROCHLORIDE 20 MG/ML
5 INJECTION INTRAMUSCULAR; INTRAVENOUS
Status: DISCONTINUED | OUTPATIENT
Start: 2020-08-06 | End: 2020-08-06 | Stop reason: HOSPADM

## 2020-08-06 RX ORDER — CELECOXIB 200 MG/1
400 CAPSULE ORAL ONCE
Status: COMPLETED | OUTPATIENT
Start: 2020-08-06 | End: 2020-08-06

## 2020-08-06 RX ORDER — HYDROMORPHONE HYDROCHLORIDE 1 MG/ML
0.25 INJECTION, SOLUTION INTRAMUSCULAR; INTRAVENOUS; SUBCUTANEOUS
Status: DISCONTINUED | OUTPATIENT
Start: 2020-08-06 | End: 2020-08-06 | Stop reason: SDUPTHER

## 2020-08-06 RX ORDER — CYCLOBENZAPRINE HCL 10 MG
5 TABLET ORAL 3 TIMES DAILY PRN
Status: DISCONTINUED | OUTPATIENT
Start: 2020-08-06 | End: 2020-08-07 | Stop reason: HOSPADM

## 2020-08-06 RX ORDER — MIDAZOLAM HYDROCHLORIDE 1 MG/ML
1 INJECTION INTRAMUSCULAR; INTRAVENOUS
Status: DISCONTINUED | OUTPATIENT
Start: 2020-08-06 | End: 2020-08-06 | Stop reason: HOSPADM

## 2020-08-06 RX ORDER — LIDOCAINE AND PRILOCAINE 25; 25 MG/G; MG/G
CREAM TOPICAL ONCE
Status: COMPLETED | OUTPATIENT
Start: 2020-08-06 | End: 2020-08-06

## 2020-08-06 RX ORDER — DEXTROSE, SODIUM CHLORIDE, AND POTASSIUM CHLORIDE 5; .45; .15 G/100ML; G/100ML; G/100ML
50 INJECTION INTRAVENOUS CONTINUOUS
Status: DISCONTINUED | OUTPATIENT
Start: 2020-08-06 | End: 2020-08-07 | Stop reason: HOSPADM

## 2020-08-06 RX ORDER — ACETAMINOPHEN 325 MG/1
650 TABLET ORAL EVERY 4 HOURS PRN
Status: DISCONTINUED | OUTPATIENT
Start: 2020-08-06 | End: 2020-08-06 | Stop reason: SDUPTHER

## 2020-08-06 RX ORDER — NALOXONE HCL 0.4 MG/ML
0.1 VIAL (ML) INJECTION
Status: DISCONTINUED | OUTPATIENT
Start: 2020-08-06 | End: 2020-08-07 | Stop reason: HOSPADM

## 2020-08-06 RX ADMIN — ROCURONIUM BROMIDE 50 MG: 10 INJECTION INTRAVENOUS at 13:07

## 2020-08-06 RX ADMIN — SODIUM CHLORIDE, POTASSIUM CHLORIDE, SODIUM LACTATE AND CALCIUM CHLORIDE: 600; 310; 30; 20 INJECTION, SOLUTION INTRAVENOUS at 14:25

## 2020-08-06 RX ADMIN — OXYCODONE 10 MG: 5 TABLET ORAL at 12:47

## 2020-08-06 RX ADMIN — DEXMEDETOMIDINE HYDROCHLORIDE 0.5 MCG/KG/HR: 4 INJECTION INTRAVENOUS at 14:21

## 2020-08-06 RX ADMIN — FENTANYL CITRATE 50 MCG: 50 INJECTION INTRAMUSCULAR; INTRAVENOUS at 18:40

## 2020-08-06 RX ADMIN — SODIUM CHLORIDE, POTASSIUM CHLORIDE, SODIUM LACTATE AND CALCIUM CHLORIDE: 600; 310; 30; 20 INJECTION, SOLUTION INTRAVENOUS at 12:59

## 2020-08-06 RX ADMIN — LIDOCAINE AND PRILOCAINE: 25; 25 CREAM TOPICAL at 10:35

## 2020-08-06 RX ADMIN — LIDOCAINE HYDROCHLORIDE 70 MG: 20 INJECTION, SOLUTION INFILTRATION; PERINEURAL at 13:07

## 2020-08-06 RX ADMIN — DEXMEDETOMIDINE HYDROCHLORIDE 8 MCG: 100 INJECTION, SOLUTION, CONCENTRATE INTRAVENOUS at 14:21

## 2020-08-06 RX ADMIN — PHENYLEPHRINE HYDROCHLORIDE 100 MCG: 10 INJECTION INTRAVENOUS at 16:47

## 2020-08-06 RX ADMIN — PHENYLEPHRINE HYDROCHLORIDE 200 MCG: 10 INJECTION INTRAVENOUS at 16:27

## 2020-08-06 RX ADMIN — HYDROCODONE BITARTRATE AND ACETAMINOPHEN 1 TABLET: 5; 325 TABLET ORAL at 21:27

## 2020-08-06 RX ADMIN — FENTANYL CITRATE 50 MCG: 50 INJECTION INTRAMUSCULAR; INTRAVENOUS at 13:35

## 2020-08-06 RX ADMIN — PHENYLEPHRINE HYDROCHLORIDE 200 MCG: 10 INJECTION INTRAVENOUS at 15:57

## 2020-08-06 RX ADMIN — DEXAMETHASONE SODIUM PHOSPHATE 8 MG: 10 INJECTION INTRAMUSCULAR; INTRAVENOUS at 13:13

## 2020-08-06 RX ADMIN — ACETAMINOPHEN 1000 MG: 500 TABLET, FILM COATED ORAL at 10:54

## 2020-08-06 RX ADMIN — PHENYLEPHRINE HYDROCHLORIDE 100 MCG: 10 INJECTION INTRAVENOUS at 15:36

## 2020-08-06 RX ADMIN — PHENYLEPHRINE HYDROCHLORIDE 100 MCG: 10 INJECTION INTRAVENOUS at 14:57

## 2020-08-06 RX ADMIN — TECHNETIUM TC 99M SULFUR COLLOID 1 DOSE: KIT at 12:10

## 2020-08-06 RX ADMIN — GABAPENTIN 300 MG: 300 CAPSULE ORAL at 12:47

## 2020-08-06 RX ADMIN — GABAPENTIN 100 MG: 100 CAPSULE ORAL at 22:24

## 2020-08-06 RX ADMIN — CEFAZOLIN SODIUM 2 G: 2 INJECTION, SOLUTION INTRAVENOUS at 13:14

## 2020-08-06 RX ADMIN — FENTANYL CITRATE 50 MCG: 50 INJECTION INTRAMUSCULAR; INTRAVENOUS at 13:11

## 2020-08-06 RX ADMIN — PROPOFOL 150 MG: 10 INJECTION, EMULSION INTRAVENOUS at 13:07

## 2020-08-06 RX ADMIN — DOXYCYCLINE 100 MG: 100 CAPSULE ORAL at 22:24

## 2020-08-06 RX ADMIN — FENTANYL CITRATE 50 MCG: 50 INJECTION INTRAMUSCULAR; INTRAVENOUS at 13:07

## 2020-08-06 RX ADMIN — DOXYCYCLINE 200 MG: 100 INJECTION, POWDER, LYOPHILIZED, FOR SOLUTION INTRAVENOUS at 12:47

## 2020-08-06 RX ADMIN — EPHEDRINE SULFATE 10 MG: 50 INJECTION INTRAVENOUS at 17:36

## 2020-08-06 RX ADMIN — FAMOTIDINE 20 MG: 10 INJECTION INTRAVENOUS at 11:19

## 2020-08-06 RX ADMIN — EPHEDRINE SULFATE 10 MG: 50 INJECTION INTRAVENOUS at 16:52

## 2020-08-06 RX ADMIN — CYCLOBENZAPRINE 5 MG: 10 TABLET, FILM COATED ORAL at 22:34

## 2020-08-06 RX ADMIN — SODIUM CHLORIDE, POTASSIUM CHLORIDE, SODIUM LACTATE AND CALCIUM CHLORIDE: 600; 310; 30; 20 INJECTION, SOLUTION INTRAVENOUS at 18:47

## 2020-08-06 RX ADMIN — DEXMEDETOMIDINE HYDROCHLORIDE 12 MCG: 100 INJECTION, SOLUTION, CONCENTRATE INTRAVENOUS at 13:52

## 2020-08-06 RX ADMIN — CELECOXIB 400 MG: 200 CAPSULE ORAL at 10:54

## 2020-08-06 RX ADMIN — FENTANYL CITRATE 50 MCG: 50 INJECTION INTRAMUSCULAR; INTRAVENOUS at 15:33

## 2020-08-06 RX ADMIN — CEFAZOLIN SODIUM 2 G: 2 INJECTION, SOLUTION INTRAVENOUS at 17:14

## 2020-08-06 RX ADMIN — DIAZEPAM 10 MG: 5 TABLET ORAL at 10:38

## 2020-08-06 RX ADMIN — PHENYLEPHRINE HYDROCHLORIDE 100 MCG: 10 INJECTION INTRAVENOUS at 15:07

## 2020-08-06 RX ADMIN — PHENYLEPHRINE HYDROCHLORIDE 100 MCG: 10 INJECTION INTRAVENOUS at 14:47

## 2020-08-06 RX ADMIN — ONDANSETRON HYDROCHLORIDE 8 MG: 8 TABLET, FILM COATED ORAL at 10:54

## 2020-08-06 RX ADMIN — PHENYLEPHRINE HYDROCHLORIDE 100 MCG: 10 INJECTION INTRAVENOUS at 16:07

## 2020-08-06 NOTE — OP NOTE
Pre-Operative Diagnosis: Acquired absence bilateral breasts    Post-Operative Diagnosis: Same    Procedure Performed:   1.  Immediate placement bilateral subpectoral tissue expanders for reconstruction ([id number])  2.  Placement of AlloDerm acellular dermal matrix bilateral    Surgeon: BERNARDINO Evans MD    Assistant: None    Anesthesia: General    Estimated Blood Loss: 20    Specimens: Left and right breast skin    Complications: None    Indications: She presented in referral from Dr. Fenton after diagnosis of left breast cancer.  We initially discussed prepectoral reconstruction but further imaging identified invasion of the tumor into the left pectoralis muscle so elected to perform a subpectoral reconstruction to not obscure this tumor margin for future surveillance or radiation.    We discussed risks, benefits and alternatives including but no limited to: bleeding, infection, asymmetry, poor or slow wound healing, need for further surgery, possible recurrence.  The patient elected to proceed.    Description of Procedure: The patient was met in the preoperative holding area.  All questions were answered and informed consent was assured.      She was marked in a standing position and the breast landmarks were drawn along with a fusiform transverse mastectomy incisions.  These marks were confirmed with Dr. Fenton.  She was transferred to the operating placed supine on the operative table.    After induction of appropriate anesthesia, a timeout was performed correctly identifying the patient, operative site, and procedure to be performed.  All present were in agreement.    After completion of the left mastectomy and sentinel biopsy there was a positive lymph node so a axillary dissection was performed.  Right mastectomy was completed as well.  All of the mastectomy skin did appear viable with good capillary refill and bleeding at the edge.  2 to 3 mm of the mastectomy edge was excised and passed off the field  for pathology.  The breast pockets were copiously irrigated and immaculate hemostasis was achieved at the mastectomy sites.  The lateral borders of the pectoralis muscle on both sides was identified and subpectoral pocket was created.  The thinned muscle on the left side from the posterior margin of the tumor was closed with 2-0 Vicryl to match the thickness of the rest of the muscle while retaining the clips from Dr. Fenton at the superficial surface of the muscle.  The inferior border of the pectoralis did not match the desired inferior border of the breast footprint so the inferior border was divided to the sternum.  Again copious antibiotic irrigation was performed and immaculate hemostasis achieved.  50% Betadine solution was left to dwell for several minutes.  The AlloDerm and expanders were brought onto the field.  The AlloDerm was sewed along the desired inferior and lateral border of the breast footprint with 2-0 PDS.  The pockets were then rinsed clear and gloves were changed.  The expanders were then brought into the pockets and sewed at the inferior and lateral tabs in the appropriate position at the IM fold.  Drains were placed and an additional drain was placed in the axilla.  Skin was closed with 2-0 Vicryl in the subcutaneous layer, 3-0 Monocryl deep dermal layer, 4 Monocryl in the intracuticular layer.  Skin was dressed with Dermabond and Nitropaste and covered with a Tegaderm.  Drain sites were dressed with bio patches.  She was placed in a well-padded Ace wrap.    The patient was then aroused from anesthesia with ease and transferred to the postoperative care area in good condition. All sponge, needle, and instrument counts were correct.

## 2020-08-06 NOTE — ANESTHESIA PROCEDURE NOTES
Airway  Urgency: elective    Date/Time: 8/6/2020 1:12 PM  Airway not difficult    General Information and Staff    Patient location during procedure: OR  Anesthesiologist: Vane Zhang MD  CRNA: Sierra Avila CRNA    Indications and Patient Condition  Indications for airway management: airway protection    Preoxygenated: yes  MILS not maintained throughout  Mask difficulty assessment: 1 - vent by mask    Final Airway Details  Final airway type: endotracheal airway      Successful airway: ETT  Cuffed: yes   Successful intubation technique: direct laryngoscopy  Facilitating devices/methods: intubating stylet  Endotracheal tube insertion site: oral  Blade: Liliana  Blade size: 3  ETT size (mm): 7.0  Cormack-Lehane Classification: grade I - full view of glottis  Placement verified by: chest auscultation and capnometry   Cuff volume (mL): 7  Measured from: lips  ETT/EBT  to lips (cm): 21  Number of attempts at approach: 1  Assessment: lips, teeth, and gum same as pre-op and atraumatic intubation

## 2020-08-06 NOTE — H&P
History of Present Illness:    She is a very nice 40-year-old white female who about 3 months ago noticed a mass in the upper outer quadrant of the left breast.  She has had some nodularity in this area and actually had a biopsy 6 years ago that was benign.  She was unable to get imaging during the COVID situation but eventually had a diagnostic mammogram performed in June.  There was an irregular mass measuring 22 mm in the posterior one third of the left breast at the 2 o'clock position.  There were also some stable punctate calcifications in the upper outer region of the left breast.  She then had ultrasound which revealed an irregular solid mass measuring 28 mm in the posterior one third of the left breast at the 2 o'clock position.  Note was also made of a small solid mass measuring 7 mm at the 3 o'clock position of the left breast.  The axilla was imaged and did not show any abnormalities.  Biopsy was recommended for the 2:00 and 3:00 masses.  At the 3 o'clock position she was found to have benign fibrocystic change.  The 2 o'clock position revealed grade 3 invasive ductal cancer with some focal associated high-grade DCIS.  The tumor was ER positive at 78%, OH positive at 98%, HER-2 negative, and the Ki-67 was 58%.               Past Medical and Surgical History:       Surgical History         Past Surgical History:   Procedure Laterality Date   • BREAST BIOPSY Left 2014     benign   • BREAST BIOPSY Left 06/2020     malignant            Medical History        Past Medical History:   Diagnosis Date   • Breast cancer (CMS/HCC) 06/2020     Left IDC            Prior Hospitalizations, other than for surgery or childbirth, and year:  None     Social History:  Patient is .  Patient has one daughters. and Patient has one sons.          Vital Signs: Blood pressure 109/72, pulse 77, temperature 98.1       Medications:     Current Outpatient Prescriptions:      Current Outpatient Medications:   •  ibuprofen  (ADVIL,MOTRIN) 400 MG tablet, Take 400 mg by mouth Every 6 (Six) Hours As Needed for Mild Pain ., Disp: , Rfl:   •  Omega-3 Fatty Acids (FISH OIL) 1000 MG capsule capsule, Take  by mouth Daily With Breakfast., Disp: , Rfl:   •  vitamin B-12 (CYANOCOBALAMIN) 100 MCG tablet, Take 50 mcg by mouth Daily., Disp: , Rfl:   •  Vitamin D, Cholecalciferol, (CHOLECALCIFEROL) 10 MCG (400 UNIT) tablet, Take 400 Units by mouth Daily., Disp: , Rfl:      Physical Examination:  General Appearance:   Patient is in no distress.  She is well kept and has an average build.   Psychiatric:  Patient with appropriate mood and affect. Alert and oriented to self, time, and place.          Breast, LEFT:  small sized, symmetric with the contralateral side and tubular in shape.  Breast skin is without erythema, edema, rashes.    There are drawings from the plastic surgeon.       Cardiovascular:  Heart rate and rhythm are regular.  Respiratory:  Lungs are clear bilaterally with no crackles or wheezes in any lung field.  Gastrointestinal:  Abdomen is soft, nondistended, and nontender.  There was no obvious hepatosplenomegaly or abdominal mass.  There are no scars from previous surgery.            Assessment:  1. Malignant neoplasm of upper-outer quadrant of left breast in female, estrogen receptor positive (CMS/HCC)      Plan-the patient will undergo bilateral mastectomies with immediate reconstruction.  She understands the risk of infection, bleeding, or an anesthetic complication.  Patient is also aware of the potential need for an axillary dissection should her sentinel node proved to be positive.

## 2020-08-06 NOTE — OP NOTE
Name: Tiera Grove  Age: 40 y.o.  Sex: female  :  1980  MRN: [id number]    Mastectomy with SN Procedure Note    Indications: This patient presents for surgical treatment of Breast Cancer involving the left breast.  She presented with a palpable mass in the upper outer quadrant and biopsy revealed a high-grade invasive ductal cancer.  She prefers bilateral mastectomies and is here for that.    Pre-operative Diagnosis: breast cancer, left    Post-operative Diagnosis: same    Procedure: #1 left total mastectomy mastectomy with [place] Node biopsy #2 right total mastectomy #3 left axillary dissection    Surgeon: Dirk Fenton MD, FACS    Assistants: NANCY Love    Anesthesia: General anesthesia      Procedure Details   The patient was seen again in the Holding Room. The risks, benefits, indications, potential complications, treatment options, and expected outcomes were discussed with the patient. The possibilities of reaction to medication, pulmonary aspiration, bleeding, recurrent infection, the need for additional procedures, failure to diagnose a condition, and creating a complication requiring transfusion or further operation were discussed with the patient. The patient and/or family concurred with the proposed plan, giving informed consent. The site of surgery was properly noted/marked.    The patient was also taken to the nuclear med department where a radioisotope injection was performed in the periareolar area of the affected breast  in the usual fashion.       The patient was taken to the Operating Room, identified as Tiera Grove  and the procedure verified as bilateral total mastectomies with left sentinel lymph node biopsy and possible axillary dissection.    A Time Out was held and the above information confirmed.          The patient was placed supine and general anesthetic was administered.     4 cc of blue dye were injected into the breast near the edge of the areola.  The   arm, breast, and chest were prepped and draped in standard fashion.   An oblique elliptical incision was made encompassing the nipple of the .left breast.  . Skin flaps were created meticulously to preserve the subdermal blood supply.  Flaps were developed to the clavicle, rectus sheath, sternum, and anterior edge of the latissimus dorsi m.  The breast was then reflected off the chest wall beginning medially and extending laterally.  The pectoralis fascia was removed with the specimen.  We did encounter a spot where the tumor appeared to be just involving the pectoralis fascia.  We therefore removed the muscle with the specimen.  Once the lateral edge of the pectoralis major muscle was identified we incised it and divided along the edge of the pectoralis minor muscle.    [place] node evaluation was performed. 5 sentinel node(s) was/were found and removed.  The lymph nodes were the highest counts was 385 and it was not blue. No other nodes were found with counts over 38 and there were no other blue nodes.  Frozen section was performed and all 5 initial sentinel lymph nodes were benign.  As we began removing the breast we found a lymph node low in the axilla that felt firm and unusual.  It was removed and sent for frozen section.  Unfortunately it returned showing metastatic cancer.  As a result we felt that it would be safest to perform an axillary dissection.  We were able to identify the axillary vein including its inferior surface.  Several branches of the axillary vein were divided between the clips.  As we continued reflecting the tissue inferiorly we detach it from the medial and lateral sidewalls.  We identified the thoracodorsal nerve as well as the long thoracic nerve of Bell and avoided injury to them.  The specimen was eventually removed and sent to pathology as left axillary contents.  We irrigated out the operative site and cauterized any small bleeding points.          .    Attention was then directed  to the right breast.  We made an elliptical incision previously drawn by the plastic surgeons.  Skin flaps were created in exactly the same fashion as previously described and carried down to the chest wall on all sides.  The breast was reflected off the chest wall beginning medially and including the pectoralis fascia.  Tissue here and expose the lateral edge of the pectoralis minor muscle.  We incised along its lateral edge and eventually were able to come across the clavipectoral fascia and divided the remaining attachments of the breast laterally.  The specimen was then removed and sent to pathology for permanent sections.  We irrigated out the operative site and cauterize small bleeding points.    Instrument, sponge, and needle counts were correct at the conclusion of my portion of the case.    The plastic surgeons will dictate their portion of the case.    Findings: Aside from the positive lymph node there were no unexpected findings.    Estimated Blood Loss: less than 50 mL                 Specimens:   ID Type Source Tests Collected by Time   A : LEFT SENTINEL LYMPH NODE #1 CALL EXT. 8898  Tissue [place] Lymph Node TISSUE PATHOLOGY EXAM Dirk Fenton MD 8/6/2020 1315   B : LEFT SENTINEL NODE #2 CALL EXT. 8898 Tissue [place] Lymph Node TISSUE PATHOLOGY EXAM Dirk Fenton MD 8/6/2020 1431   C : LEFT SENTINEL NODE #3 CALL EXT. 8898 Tissue Heath Lymph Node TISSUE PATHOLOGY EXAM Dirk Fenton MD 8/6/2020 1432   D (Not marked as sent) : LEFT BREAST STITCHED AT 12:00 Tissue Breast, Left TISSUE PATHOLOGY EXAM Dirk Fenton MD 8/6/2020 1416   E : LEFT SENTINEL NODE #4 CALL EXT. 8898 Tissue Heath Lymph Node TISSUE PATHOLOGY EXAM Dirk Fenton MD 8/6/2020 1435   F (Not marked as sent) : LEFT BREAST POSTERIOR MARGIN. STICH MARKS NEW MARGIN. CALL EXT. 8898 Tissue Breast, Left TISSUE PATHOLOGY EXAM Dirk Fenton MD 8/6/2020 1445   G : left breast sentinel node #5  Tissue Breast, Left TISSUE PATHOLOGY EXAM Dirk Fenton MD 8/6/2020 1500   H (Not marked as sent) : left axillary contents Tissue Axilla, Left TISSUE PATHOLOGY EXAM Dirk Fenton MD 8/6/2020 1614   I (Not marked as sent) : right breast tissue stitch marks 12:00 Tissue Breast, Right TISSUE PATHOLOGY EXAM Dirk Fenton MD 8/6/2020 1637   J (Not marked as sent) : left breast skin Tissue Breast, Left TISSUE PATHOLOGY EXAM Dirk Fenton MD 8/6/2020 1638       Complications: None; patient tolerated the procedure well.           Disposition: PACU - hemodynamically stable.           Condition: stable

## 2020-08-06 NOTE — H&P
Allergies     NKDA    Past Medical History   Breast Mass/Cyst/Biopsy: Y  Cancer: Y - breast  Headaches or Frequent Migraines: Y  Wear Glasses/Contact Lenses: Y  Family History     Father  - Diabetes mellitus    Maternal Aunt  - Malignant tumor of breast    Paternal Aunt  - Malignant tumor of breast    Social History   General Surgery  Tobacco Smoking Status: Never smoker   Vitals   None recorded.   HPI   This is a pleasant 40-year-old lady who presents in referral from Dr. Fenton after diagnosis of left breast cancer. She is discussed her treatment options with him and will proceed with bilateral mastectomies. The MRI did identify some invasion of the muscle. She is interested in immediate reconstruction and has done quite a bit of research ahead of time and is leaning toward tissue expander and implant-based reconstruction.   She is otherwise reasonably healthy and has had no previous breast surgery. After reconstruction she would like to be similar to her current size but given her significant ptosis after her children would like for things to be a bit more lifted which is certainly realistic for an implant-based reconstruction.  ROS   Patient reports no fever. She reports no chest pain. She reports no cough, no wheezing, and no shortness of breath.   Physical Exam     Patient is a 40-year-old female.   Chaperone: Chaperone: present.   Constitutional: General Appearance: healthy-appearing, well-nourished, and well-developed. Level of Distress: NAD.   Psychiatric: Mental Status: normal mood and affect and active and alert.   Lungs: Respiratory effort: no dyspnea.   Cardiovascular: Heart Auscultation: RRR.   Breast: Breast: Small breast size bilaterally with reasonable symmetry, grade 3 ptosis with a bit of tuberous breast deformity with nipple herniation and short nipple to fold distance, right breast with no dominant lumps or masses left breast with induration secondary to biopsy. Inspection/Palpation: Sternal  Notch to Nipple Right 26 Left 26, Nipple to Fold: Right 7 Left7, Base Width: Right 15.5 Left 15.5, and Upper Breast Border: Right 14 Left 14.5.   Assessment / Plan     We will proceed with bilateral subpectoral expander placement with alloderm sling. She did have imaging which demonstrated tumor involvement into the muscle and I have discussed with Dr. Fenton that we he would recommend a subpectoral expander. I agree with this plan. We discussed still using alloderm dermal matrix for soft tissue support of the lateral breast border and decreased risk of capsular contracture. Discussed risks of bleeding, infection, poor wound healing, need for additional surgery. She was given the ASPS consent form and my post op protocol.

## 2020-08-06 NOTE — ANESTHESIA PREPROCEDURE EVALUATION
Anesthesia Evaluation     NPO Solid Status: > 8 hours  NPO Liquid Status: < 2 hours           Airway   Mallampati: II  TM distance: >3 FB  Neck ROM: full  Narrow palate  Dental - normal exam     Pulmonary - normal exam   Cardiovascular - normal exam        Neuro/Psych  (+) headaches,     GI/Hepatic/Renal/Endo      Musculoskeletal     Abdominal    Substance History      OB/GYN          Other      history of cancer                    Anesthesia Plan    ASA 2     general     intravenous induction     Anesthetic plan, all risks, benefits, and alternatives have been provided, discussed and informed consent has been obtained with: patient.

## 2020-08-07 VITALS
TEMPERATURE: 96.9 F | OXYGEN SATURATION: 98 % | RESPIRATION RATE: 16 BRPM | WEIGHT: 159.25 LBS | SYSTOLIC BLOOD PRESSURE: 97 MMHG | BODY MASS INDEX: 25.59 KG/M2 | HEART RATE: 95 BPM | DIASTOLIC BLOOD PRESSURE: 57 MMHG | HEIGHT: 66 IN

## 2020-08-07 PROCEDURE — 25010000002 ENOXAPARIN PER 10 MG: Performed by: PLASTIC SURGERY

## 2020-08-07 RX ORDER — AMOXICILLIN 250 MG
2 CAPSULE ORAL DAILY PRN
Qty: 30 TABLET | Refills: 1 | Status: SHIPPED | OUTPATIENT
Start: 2020-08-07 | End: 2020-08-25

## 2020-08-07 RX ORDER — CYCLOBENZAPRINE HCL 5 MG
5 TABLET ORAL 3 TIMES DAILY PRN
Qty: 20 TABLET | Refills: 1 | Status: SHIPPED | OUTPATIENT
Start: 2020-08-07 | End: 2021-05-07

## 2020-08-07 RX ORDER — GABAPENTIN 100 MG/1
100 CAPSULE ORAL EVERY 8 HOURS
Qty: 60 CAPSULE | Refills: 1 | Status: SHIPPED | OUTPATIENT
Start: 2020-08-07 | End: 2021-05-07

## 2020-08-07 RX ORDER — DOXYCYCLINE 100 MG/1
100 CAPSULE ORAL 2 TIMES DAILY
Qty: 6 CAPSULE | Refills: 0 | Status: SHIPPED | OUTPATIENT
Start: 2020-08-07 | End: 2020-08-10

## 2020-08-07 RX ORDER — HYDROCODONE BITARTRATE AND ACETAMINOPHEN 10; 325 MG/1; MG/1
1 TABLET ORAL EVERY 4 HOURS PRN
Qty: 20 TABLET | Refills: 0 | Status: SHIPPED | OUTPATIENT
Start: 2020-08-07 | End: 2020-08-25

## 2020-08-07 RX ORDER — ONDANSETRON 4 MG/1
4 TABLET, FILM COATED ORAL EVERY 6 HOURS PRN
Qty: 10 TABLET | Refills: 1 | Status: SHIPPED | OUTPATIENT
Start: 2020-08-07 | End: 2020-08-25

## 2020-08-07 RX ORDER — ACETAMINOPHEN 325 MG/1
650 TABLET ORAL EVERY 4 HOURS PRN
Qty: 60 TABLET | Refills: 0 | Status: SHIPPED | OUTPATIENT
Start: 2020-08-07 | End: 2020-08-25

## 2020-08-07 RX ORDER — DOCUSATE SODIUM 250 MG
250 CAPSULE ORAL 2 TIMES DAILY PRN
Qty: 60 CAPSULE | Refills: 1 | Status: SHIPPED | OUTPATIENT
Start: 2020-08-07 | End: 2020-08-25

## 2020-08-07 RX ADMIN — HYDROCODONE BITARTRATE AND ACETAMINOPHEN 1 TABLET: 5; 325 TABLET ORAL at 02:47

## 2020-08-07 RX ADMIN — DOCUSATE SODIUM 100 MG: 100 CAPSULE, LIQUID FILLED ORAL at 09:16

## 2020-08-07 RX ADMIN — HYDROCODONE BITARTRATE AND ACETAMINOPHEN 1 TABLET: 5; 325 TABLET ORAL at 07:06

## 2020-08-07 RX ADMIN — CYCLOBENZAPRINE 5 MG: 10 TABLET, FILM COATED ORAL at 05:41

## 2020-08-07 RX ADMIN — DOXYCYCLINE 100 MG: 100 CAPSULE ORAL at 09:16

## 2020-08-07 RX ADMIN — CYCLOBENZAPRINE 5 MG: 10 TABLET, FILM COATED ORAL at 13:46

## 2020-08-07 RX ADMIN — GABAPENTIN 100 MG: 100 CAPSULE ORAL at 13:46

## 2020-08-07 RX ADMIN — ENOXAPARIN SODIUM 40 MG: 40 INJECTION SUBCUTANEOUS at 09:16

## 2020-08-07 RX ADMIN — GABAPENTIN 100 MG: 100 CAPSULE ORAL at 05:40

## 2020-08-07 RX ADMIN — POTASSIUM CHLORIDE, DEXTROSE MONOHYDRATE AND SODIUM CHLORIDE 50 ML/HR: 150; 5; 450 INJECTION, SOLUTION INTRAVENOUS at 02:32

## 2020-08-07 RX ADMIN — HYDROCODONE BITARTRATE AND ACETAMINOPHEN 1 TABLET: 5; 325 TABLET ORAL at 11:17

## 2020-08-07 NOTE — PROGRESS NOTES
SUBJECTIVE:   Did well overnight.  Has had minimal pain, no nausea.  Tolerated reg diet, + void, + ambulate.    OBJECTIVE:  Vitals:    08/07/20 0602   BP: 95/64   Pulse: 67   Resp: 18   Temp: 97 °F (36.1 °C)   SpO2: 99%     Physical Exam  Resting comfortably in bed  NAD  Bilateral mastectomy incisions c/d/i, no ecchymosis, no free fluid collection, drains with thin serosanguinous drainage    PLAN:  POD1 subpectoral TE after left modified radical and right simple mastectomy  - doing well, ok for dc   - minimal arm activity, keep shoulders down  - drain care teaching  - leave dressing in place  - f/u with me Monday    Dc Dx: left breast cancer  Dc Condition: good

## 2020-08-07 NOTE — PROGRESS NOTES
Postoperative rounding note breast surgery    Postoperative day: 1     Overnight events: The patient is done very well overnight.  She does have some discomfort but it is well-tolerated by the oral pain medication.  The drains have been functioning well and she is tolerating her diet.    Vitals:    08/07/20 1015   BP: 97/57   Pulse: 95   Resp: 16   Temp: 96.9 °F (36.1 °C)   SpO2: 98%       Examination:     The patient is alert and oriented.  Her Ace bandage is in place and her dressings/incisions are clean dry and intact.  There are no undrained fluid collections underneath her mastectomy flaps.  Her drains are serosanguineous.  Extremities-no calf tenderness or edema  Assessment:  Postoperative day 1 doing well.     We'll plan to saline lock her IV fluids.  We will have her complete her drain teaching.  We will make sure that she is adequately ambulating and tolerates her breakfast and/or lunch.  She may go home later today.    Her postoperative wound care and activity instructions will be given by her plastic surgeon, Dr. Wermerling.    She already has a follow-up visit with me scheduled.  I asked her to call the office if she needs a reminder on the date and time.  I will also contact her when the pathology report is back.

## 2020-08-07 NOTE — PLAN OF CARE
Problem: Patient Care Overview  Goal: Plan of Care Review  Outcome: Ongoing (interventions implemented as appropriate)  Flowsheets (Taken 8/7/2020 0331)  Plan of Care Reviewed With: patient  Note:   Patient has LORENA drain x 5, tubing stripped.  Incision with dermabond, gauze, kerlix and ace wrap in place.  Limb alert armband placed to patient's left arm.  HOB elevated.  Instructed patient that she can elevate her arms to level of shoulder.  Able to ambulate with standby assist, voids  without  difficulty.  Medicated with Norco for pain control.  IVF infusing.  V/S stable.  Using IS.

## 2020-08-07 NOTE — PROGRESS NOTES
Continued Stay Note  [facility]     Patient Name: Tiera Grove  MRN: [id number]  Today's Date: 8/7/2020    Admit Date: 8/6/2020    Discharge Plan     Row Name 08/07/20 1023       Plan    Plan  Home    Plan Comments  Discharge order noted. Met with patient who confirmed DC plan is home. Stated spouse will assist as needed and provide transportation at DC . Stated her mother would be comming to stay with her and assist also. Denies any needs/equipment.         Discharge Codes    No documentation.       Expected Discharge Date and Time     Expected Discharge Date Expected Discharge Time    Aug 7, 2020             Christina Smith RN

## 2020-08-07 NOTE — ANESTHESIA POSTPROCEDURE EVALUATION
"Patient: Tiera Grove    Procedure Summary     Date:  08/06/20 Room / Location:  [facility] OR [address] [place] [address] MAIN OR    Anesthesia Start:  1259 Anesthesia Stop:  1900    Procedures:       BREAST MASTECTOMY WITH SENTINEL NODE BIOPSY BILATERAL WITH IMMEDIATE RECONSTRUCTION The sentinel lymph node biopsy will be performed on the left side only.  There is a possibility of an axillary dissection (Bilateral Breast)      BILATERAL PREPECTORIAL PLACEMEN OF TISSUE EXPANDERS AND ALLODERM (Bilateral Breast) Diagnosis:       Malignant neoplasm of upper-outer quadrant of left breast in female, estrogen receptor positive (CMS/HCC)      (Malignant neoplasm of upper-outer quadrant of left breast in female, estrogen receptor positive (CMS/HCC) [C50.412, Z17.0])    Surgeon:  Dirk eFnton MD; Néstor Evans MD Provider:  Vane Zhang MD    Anesthesia Type:  general ASA Status:  2          Anesthesia Type: general    Vitals  Vitals Value Taken Time   /70 8/6/2020  8:00 PM   Temp 36.6 °C (97.8 °F) 8/6/2020  6:53 PM   Pulse 87 8/6/2020  8:07 PM   Resp 20 8/6/2020  8:00 PM   SpO2 97 % 8/6/2020  8:07 PM   Vitals shown include unvalidated device data.        Post Anesthesia Care and Evaluation    Patient location during evaluation: PACU  Patient participation: complete - patient participated  Level of consciousness: awake and alert  Pain management: adequate  Airway patency: patent  Anesthetic complications: No anesthetic complications    Cardiovascular status: acceptable  Respiratory status: acceptable  Hydration status: acceptable    Comments: /70   Pulse 94   Temp 36.6 °C (97.8 °F) (Oral)   Resp 20   Ht 167.6 cm (66\")   Wt 72.2 kg (159 lb 4 oz)   SpO2 97%   BMI 25.70 kg/m²         "

## 2020-08-10 ENCOUNTER — TELEPHONE (OUTPATIENT)
Dept: MAMMOGRAPHY | Facility: CLINIC | Age: 40
End: 2020-08-10

## 2020-08-18 ENCOUNTER — CLINICAL SUPPORT (OUTPATIENT)
Dept: OTHER | Facility: HOSPITAL | Age: 40
End: 2020-08-18

## 2020-08-18 DIAGNOSIS — Z17.0 MALIGNANT NEOPLASM OF UPPER-OUTER QUADRANT OF LEFT BREAST IN FEMALE, ESTROGEN RECEPTOR POSITIVE (HCC): Primary | ICD-10-CM

## 2020-08-18 DIAGNOSIS — C50.412 MALIGNANT NEOPLASM OF UPPER-OUTER QUADRANT OF LEFT BREAST IN FEMALE, ESTROGEN RECEPTOR POSITIVE (HCC): Primary | ICD-10-CM

## 2020-08-18 PROCEDURE — G0463 HOSPITAL OUTPT CLINIC VISIT: HCPCS

## 2020-08-18 PROCEDURE — 99203 OFFICE O/P NEW LOW 30 MIN: CPT | Performed by: MEDICAL GENETICS

## 2020-08-21 NOTE — PROGRESS NOTES
NAME:Tiera Grove            YOB: 1980    MRN:[id number]    DATE SEEN:2020    COUNSELOR:    : Trevon Nguyen M.D.      Tiera Grove was seen for genetic counseling at the [facility].  She referral was initiated by Dirk Fenton MD because of a recent diagnosis of breast cancer and the possibility of a hereditary basis for the malignancy.    Review of the patient's health history indicated prior to Ms. Grove's breast cancer diagnosis she had been healthy.  She still has her ovaries.  March of this year she felt a mass in the upper outer quadrant of the left breast.  The ablation was delayed because of the pandemic and a mammogram in  revealed a 22 mm irregular mass in the posterior one third of the left breast.  There was stable punctate calcifications.  An ultrasound revealed an irregular solid mass measuring 28 mm in the posterior one third of the left breast and a small solid mass measuring 7 mm in the left breast as well.  Biopsy revealed that the small solid mass represented benign fibrocystic changes.  The initially identified mass revealed grade 3 invasive ductal cancer with some focal associated high-grade ductal carcinoma in situ.  Tumor was 78% estrogen receptor positive, 98% progesterone receptor positive and the tumor was HER-2 negative.  Ki-67 was 58%.  On 2020 also underwent a bilateral mastectomy.  Three of 16 lymph nodes were positive.  She is to be seen in consultation by Dr. Lester on 2020.    Review of the family history and pedigree revealed that Shell 40 years of age.  She has a daughter 18 and a son almost 16.  He has 3 brothers and a sister both of her parents are 71 and neither her siblings or parents have had cancer.  2 maternal first cousins had breast cancer in their 30s.  A maternal aunt had breast and adrenal cancer and she  at 60 and a maternal uncle colon  cancer in his 30s.  No aunt had breast cancer at 64.  The rest of the family history was noncontributory and there is no Ashkenazi Hinduism descent.    The rest of the genetic counseling session focused on the possibility of a hereditary basis for her breast cancer diagnosis.  She was told that approximately 70% of breast cancer is sporadic in nature, usually occurring later in life and without a significant family history of breast cancer.  In about 20% to 25% of cases, familial clustering occurs suggesting an increased genetic predisposition, although a specific abnormality within a gene is not detected.  In about 5% to 10% of cases, a hereditary basis represents the cause for the cancer and a single gene is found to have a pathogenic variant that significantly increases the risk for breast cancer, may increase the risk for a second breast cancer and other malignancies.  In addition, the pathogenic variant places first degree relatives at 50% risk for inheriting the hereditary condition and, if not inherited, the risk for cancer would be the same as the risk for that cancer in the general population.    The most common cause of hereditary breast cancer is hereditary breast and ovarian cancer syndrome.  About 50% of hereditary breast cancer either results from mutation in the BRCA1 gene (a tumor suppressor gene on chromosome 17) or the BRCA2 gene(a tumor suppressor gene on chromosome 13). The presence of a mutation either of these genes increases the lifetime risk for breast cancer from 12% to 13% in the general population potentially to greater than 80%.  It also significantly increases the risk for a second primary breast cancer.  There is also an increased risk for ovarian cancer. In the case of BRCA1, the ovarian cancer risk can be as high as 44% and in the case of BRCA2 as high as 27%, compared to the general population risk of 1.6%.  An increased risk for prostate cancer, male breast cancer, melanoma and  pancreatic cancer also exists,either with a BRCA1 or  BRCA2 pathogenic variant.  Characteristics of hereditary breast and ovarian cancer syndrome frequently include but are not limited to an early age of diagnosis of breast cancer, multiple breast cancer primaries, the presence of both breast and ovarian cancer in the same woman or ovarian cancer alone, male breast cancer, family history of breast and ovarian cancer, ovarian cancer or multiple cases of breast cancer,triple negative breast cancer and Ashkenazi Amish descent.    Since the original discovery of the BRCA1 and BRCA2 genes in the mid 1990s several moderate risk genes have been identified that may increase the risk fro breast cancer in the range of 20% to as high as 60%.  These moderate breast risk genes in general are not associated with an increased risk for ovarian cancer but may be associated with an increased risk for other malignancies.    On Ms. Grove's breast cancer diagnosis at a young age and the possibility of a hereditary basis for the malignancy, a blood sample will be sent today to [facility] to perform there 84 gene multi cancer panel.  In addition, additional genes not on the breast cancer panel that are on the breast and gynecologic cancer panel and colorectal cancer panel will be pursued.  Should the out-of-pocket cost to her not exceed $100 [facility] will proceed with genetic testing and results will be communicated to her in 2 to 3 weeks.  Should insurance coverage be denied the maximum out-of-pocket cost to proceed with genetic testing would be $250.  Results are reported either as negative in which no pathogenic variant has been found, positive him which a pathogenic variant has been detected, and/or identification of a variant of uncertain significance, in which a change in the gene has been identified but currently data is insufficient to classified either is benign or pathogenic.  However in most instances eventually when a variant  of uncertain significance is reclassified it is considered to be benign although this is not always the case and pathogenicity cannot be ruled out at this time.  In the interim period of time if Tiera Grove has any additional questions I can be reached either at [phone] or [phone].      Total time of the encounter was 40 minutes and 40 minutes was spent counseling.      Trevon Nguyen MD  08/18/2020  Clinical

## 2020-08-24 NOTE — PROGRESS NOTES
Subjective     REASON FOR CONSULTATION: Left breast cancer pT2N1A ER WI positive HER-2 negative grade 3 infiltrating ductal carcinoma post bilateral mastectomy and left axillary node dissection on 8/6/2020  Provide an opinion on any further workup or treatment                             REQUESTING PHYSICIAN: MD Christina Pino APRN    RECORDS OBTAINED:  Records of the patients history including those obtained from the referring provider were reviewed and summarized in detail.    HISTORY OF PRESENT ILLNESS:  The patient is a 40 y.o. year old female who is here for an opinion about the above issue.    History of Present Illness patient is a 40-year-old white female with no chronic medical illnesses presented with a palpable mass in her left breast a month before her routine screening mammogram was due.    Patient reports she started mammography at age 33 and at age 34 had a benign biopsy done on her left breast.  She was very Denominational about getting her mammograms because of family history of breast cancer and had a normal mammogram in February of last year and this year because of pandemic her mammogram was delayed and she felt a mass before the mammogram and brought her to the attention of the radiologist.  A diagnostic mammogram was done followed by a biopsy on 6/12/2020 which showed Invasive mammary carcinoma no special type grade 3- 8 mm in size with focal DCIS high-grade ER 79% WI 98% HER-2 1+ Ki-67 of 59%  Patient was referred to Dr. Fenton underwent bilateral breast MRI which showed a mass in the left breast at the 3 o'clock position measuring 2.3 x 1.9 x 2.6 cm which abutted the chest wall with absence of portions of the pectoralis and 9 mm area of focal attachment with tenting of the pectoralis muscle there is no left axillary or internal mammary adenopathy appreciatedRight breast was benign    Patient underwent bilateral mastectomy with  sentinel node biopsy followed by axillary dissection on the left on 2020 with the finding of a Grade 3 invasive ductal carcinoma measuring 30 mm with invasive cancer invading skeletal muscle and present focally at the posterior margin of excision which was then cleared by additional excision of the posterior margin all other margins were free of invasive cancer and DCIS  and additional 2 of 10 nodes in the axillary dissection specimen were positive for micrometastasis with a single focus of capsular extranodal lymph vascular space invasion noted.  The right breast was benign    Patient is referred to discuss adjuvant treatment and is asymptomatic at this time    She is  2 para 2 menarche was age 11 she is premenopausal but has not had a period in 12 years because of the Mirena IUD which she has in place  First childbirth was at age 22 she did not breast-feed  Family history is positive for maternal aunt with breast cancer metastatic to the adrenal at the age 65 maternal uncle with colon cancer in his 30s maternal grandmother with colon cancer in her 40s 2 maternal first cousins with breast cancer in their 50s a paternal aunt with breast cancer in her 60s .  There is multiple family members with atypical moles in her family history her 9 gene invitae panel was negative and the rest of the genetic profile was resent and pending    She is not a smoker and drinker and works as a dental hygienist    She did have a premalignant mole removed from her back many years ago  She is never had a DVT and no family history of DVT      Past Medical History:   Diagnosis Date   • Breast cancer (CMS/HCC) 2020    Left IDC   • Eczema    • History of iron deficiency anemia    • History of tension headache    • Vitamin D deficiency         Past Surgical History:   Procedure Laterality Date   • BREAST BIOPSY Left     benign   • BREAST BIOPSY Left 2020    malignant   • BREAST RECONSTRUCTION Bilateral 2020     Procedure: BILATERAL PREPECTORIAL PLACEMEN OF TISSUE EXPANDERS AND ALLODERM;  Surgeon: Néstor Evans MD;  Location: [facility] MAIN OR;  Service: Plastics;  Laterality: Bilateral;   • COLONOSCOPY     • EAR TUBES      SEVERAL TIMES   • ENDOSCOPY     • MASTECTOMY W/ SENTINEL NODE BIOPSY Bilateral 8/6/2020    Procedure: BREAST MASTECTOMY WITH SENTINEL NODE BIOPSY BILATERAL WITH IMMEDIATE RECONSTRUCTION The sentinel lymph node biopsy will be performed on the left side only.  There is a possibility of an axillary dissection;  Surgeon: Dirk Fenton MD;  Location: [facility] OR;  Service: General;  Laterality: Bilateral;   • WISDOM TOOTH EXTRACTION          Current Outpatient Medications on File Prior to Visit   Medication Sig Dispense Refill   • cyclobenzaprine (FLEXERIL) 5 MG tablet Take 1 tablet by mouth 3 (Three) Times a Day As Needed for Muscle Spasms. 20 tablet 1   • gabapentin (Neurontin) 100 MG capsule Take 1 capsule by mouth Every 8 (Eight) Hours. 60 capsule 1   • ibuprofen (ADVIL,MOTRIN) 800 MG tablet Take 800 mg by mouth Every 6 (Six) Hours As Needed for Mild Pain .     • vitamin B-12 (CYANOCOBALAMIN) 1000 MCG tablet Take 1,000 mcg by mouth Daily.     • vitamin D (ERGOCALCIFEROL) 1.25 MG (61919 UT) capsule capsule Take 50,000 Units by mouth 1 (One) Time Per Week. FRIDAYS     • [DISCONTINUED] acetaminophen (TYLENOL) 325 MG tablet Take 2 tablets by mouth Every 4 (Four) Hours As Needed for Mild Pain . 60 tablet 0   • [DISCONTINUED] docusate sodium (COLACE) 250 MG capsule Take 1 capsule by mouth 2 (Two) Times a Day As Needed for Constipation. 60 capsule 1   • [DISCONTINUED] HYDROcodone-acetaminophen (NORCO)  MG per tablet Take 1 tablet by mouth Every 4 (Four) Hours As Needed for Moderate Pain  (Pain). 20 tablet 0   • [DISCONTINUED] ondansetron (Zofran) 4 MG tablet Take 1 tablet by mouth Every 6 (Six) Hours As Needed for Nausea or Vomiting. 10 tablet 1   • [DISCONTINUED] sennosides-docusate  "(PERICOLACE) 8.6-50 MG per tablet Take 2 tablets by mouth Daily As Needed for Constipation. 30 tablet 1     No current facility-administered medications on file prior to visit.         ALLERGIES:  No Known Allergies     Social History     Socioeconomic History   • Marital status:      Spouse name: Christopher   • Number of children: Not on file   • Years of education: Not on file   • Highest education level: Not on file   Occupational History     Employer: [employer]   Tobacco Use   • Smoking status: Never Smoker   • Smokeless tobacco: Never Used   Substance and Sexual Activity   • Alcohol use: Yes     Comment: Occasionally   • Drug use: Never        Family History   Problem Relation Age of Onset   • Hearing loss Mother    • Hyperlipidemia Mother    • Alcohol abuse Father    • Deep vein thrombosis Father    • Hyperlipidemia Father    • Hypertension Father    • Melanoma Maternal Aunt    • Breast cancer Maternal Aunt    • Cancer Maternal Aunt         Adrenal glands   • Asthma Maternal Uncle    • Melanoma Maternal Uncle    • Colon cancer Maternal Uncle    • Breast cancer Paternal Aunt    • Heart failure Maternal Grandmother    • Depression Maternal Grandfather    • Heart failure Maternal Grandfather    • Birth defects Nephew    • Breast cancer Maternal Cousin    • Breast cancer Maternal Cousin    • Malig Hyperthermia Neg Hx         Review of Systems   Genitourinary: Positive for menstrual problem (amenorrheic for 12 years due to the Mirena IUD).   Psychiatric/Behavioral: The patient is nervous/anxious.    All other systems reviewed and are negative.       Objective     Vitals:    08/25/20 1307   BP: 101/64   Pulse: 80   Resp: 18   Temp: 97.8 °F (36.6 °C)   TempSrc: Temporal   SpO2: 99%   Weight: 73.8 kg (162 lb 9.6 oz)   Height: 167.6 cm (65.98\")   PainSc:   4   PainLoc: Chest  Comment: Bilateral Masectomy     Current Status 8/25/2020   ECOG score 0       Physical Exam  P  CONSTITUTIONAL:  Vital signs " reviewed.  No distress, looks comfortable.  EYES:  Conjunctiva and lids unremarkable.  PERRLA  EARS,NOSE,MOUTH,THROAT:  Ears and nose appear unremarkable.  Lips, teeth, gums appear unremarkable.  RESPIRATORY:  Normal respiratory effort.  Lungs clear to auscultation bilaterally.  BREASTS: Lateral mastectomies with expanders in place not expanded yet  CARDIOVASCULAR:  Normal S1, S2.  No murmurs rubs or gallops.  No significant lower extremity edema.  GASTROINTESTINAL: Abdomen appears unremarkable.  Nontender.  No hepatomegaly.  No splenomegaly.  LYMPHATIC:  No cervical, supraclavicular, axillary lymphadenopathy.  SKIN:  Warm.  No rashes.  Scar on her back from a premalignant mole removal  PSYCHIATRIC:  Normal judgment and insight.  Normal mood and affect.      RECENT LABS:  Hematology WBC   Date Value Ref Range Status   08/25/2020 7.64 3.40 - 10.80 10*3/mm3 Final     RBC   Date Value Ref Range Status   08/25/2020 3.96 3.77 - 5.28 10*6/mm3 Final     Hemoglobin   Date Value Ref Range Status   08/25/2020 12.3 12.0 - 15.9 g/dL Final     Hematocrit   Date Value Ref Range Status   08/25/2020 35.3 34.0 - 46.6 % Final     Platelets   Date Value Ref Range Status   08/25/2020 178 140 - 450 10*3/mm3 Final      Tissue Pathology Exam: [id number]    Final Diagnosis   1. Lymph Node, Left [place] #1, Excision:               A. Benign lymph node (0/1).     2. Lymph Node, Left [place] #2, Excision:               A. Benign lymph node (0/1).     3. Lymph Node, Left [place] #3, Excision:               A. Single focus of capsular extra savanna lymph-vascular space invasion identified (LVI positive).                B. Two benign lymph nodes (0/2).     4. Lymph Node, Left [place] #4, Excision:               A. Benign lymph node (0/1).     5. Lymph Node, Left [place] #5, Excision:               A. Positive for metastatic carcinoma measuring 20 mm maximally with focal extra savanna extension (1/1).     6. Breast, Left, Mastectomy (516  grams):               A. INVASIVE MAMMARY CARCINOMA, NO SPECIAL TYPE (DUCTAL), [place] histologic grade III                    (tubules = 3, nuclei = 3, mitoses = 3), measuring 30 mm maximally.                            1. Invasive carcinoma invades skeletal muscle and is focally present at the posterior margin of excision                                over a 0.1 mm area.  All other margins are free of invasive carcinoma.               B. ASSOCIATED DUCTAL CARCINOMA IN SITU (DCIS), intermediate nuclear grade with solid and cribriform                   architecture and central comedo type necrosis measuring 50 mm maximally (present in slices 7-11, five                   consecutive slices).                            1. All margins are free of in situ carcinoma.               C. Focal lymphovascular space invasion is identified.               D. Background breast parenchyma shows clustered cysts, focal atypical lobular hyperplasia and                   microcalcifications identified in association with carcinoma and benign breast elements.               E. See synoptic template for complete tumor details.     7. Breast, Right, Mastectomy (540 grams):               A. Benign skin, subcutaneous tissue and breast parenchyma with mild duct ectasia, apocrine metaplasia and                   focal clustered cysts.     8. Breast, Left, New Posterior Margin, Inked and Oriented Excision:               A. Skeletal muscle, negative for invasive carcinoma; the final posterior margin is free of tumor by 5 mm.      9. Soft Tissue, Left Axillary Dissection:                A. 2 of 10 lymph nodes positive for metastatic carcinoma (2/10); largest metastatic focus measures 7 mm                   maximally with no extranodal extension.               B. Single focus of capsular extra savanna lymph-vascular space invasion identified (LVI positive).      10. Skin, Left Breast, Excision:                A. Benign skin and subcutaneous  tissue.     11. Skin, Right Breast, Excision:               A. Benign skin and subcutaneous tissue.     mec/jse         mec/pkm/jse         Electronically signed by Gary Ying MD on 8/8/2020 at 1335         Assessment/Plan   [id number] grade 3 infiltrating ductal carcinoma ER NC positive HER-2 negative post bilateral mastectomy and left sentinel node biopsy and axillary dissection with a involvement of pectoralis muscle margins clear and lymph vascular invasion    2.  Positive family history of multiple malignancies 9 gene invitae panel negative re-request pending    3.  History of premalignant mole removal    Plan  1.  Staging with CAT scans and bone scans  2.   Oncotype DX score  3.  Chemo education with ACT vs TC depending on Oncotype score   4.  Port placement by Dr. Fenton and return in 3 to 4 weeks to start chemotherapy    I explained the rationale of adjuvant treatment to Kylie and her  and they understand that we are treating micrometastasis and not the primary tumor which has been removed.  I told him they would need radiation based on the 3 node positive and muscle invasion . They would also need 10 years of hormonal blockade with Lupron initially because of the high risk nature of her disease    She has an IUD in place but I can wait until we finish her chemotherapy to have this removed  .  She lives in [facility] and is trying to get treatment closer to home and may decide to get her chemotherapy there will notify us if that is the case I encouraged her to get the radiation every facility that can block her heart because of the left-sided cancer and that she may be better off doing that at least in our facility    Greater than 55 minutes spent face-to-face and reviewing records

## 2020-08-25 ENCOUNTER — CONSULT (OUTPATIENT)
Dept: ONCOLOGY | Facility: CLINIC | Age: 40
End: 2020-08-25

## 2020-08-25 ENCOUNTER — LAB (OUTPATIENT)
Dept: LAB | Facility: HOSPITAL | Age: 40
End: 2020-08-25

## 2020-08-25 ENCOUNTER — OFFICE VISIT (OUTPATIENT)
Dept: MAMMOGRAPHY | Facility: CLINIC | Age: 40
End: 2020-08-25

## 2020-08-25 ENCOUNTER — TELEPHONE (OUTPATIENT)
Dept: ONCOLOGY | Facility: CLINIC | Age: 40
End: 2020-08-25

## 2020-08-25 VITALS
DIASTOLIC BLOOD PRESSURE: 64 MMHG | TEMPERATURE: 97.8 F | WEIGHT: 162.6 LBS | HEART RATE: 80 BPM | RESPIRATION RATE: 18 BRPM | BODY MASS INDEX: 26.13 KG/M2 | SYSTOLIC BLOOD PRESSURE: 101 MMHG | HEIGHT: 66 IN | OXYGEN SATURATION: 99 %

## 2020-08-25 VITALS — SYSTOLIC BLOOD PRESSURE: 100 MMHG | DIASTOLIC BLOOD PRESSURE: 60 MMHG

## 2020-08-25 DIAGNOSIS — Z17.0 MALIGNANT NEOPLASM OF UPPER-OUTER QUADRANT OF LEFT BREAST IN FEMALE, ESTROGEN RECEPTOR POSITIVE (HCC): Primary | ICD-10-CM

## 2020-08-25 DIAGNOSIS — C50.412 MALIGNANT NEOPLASM OF UPPER-OUTER QUADRANT OF LEFT BREAST IN FEMALE, ESTROGEN RECEPTOR POSITIVE (HCC): Primary | ICD-10-CM

## 2020-08-25 DIAGNOSIS — C50.919 MALIGNANT NEOPLASM OF FEMALE BREAST, UNSPECIFIED ESTROGEN RECEPTOR STATUS, UNSPECIFIED LATERALITY, UNSPECIFIED SITE OF BREAST (HCC): Primary | ICD-10-CM

## 2020-08-25 LAB
BASOPHILS # BLD AUTO: 0.07 10*3/MM3 (ref 0–0.2)
BASOPHILS NFR BLD AUTO: 0.9 % (ref 0–1.5)
DEPRECATED RDW RBC AUTO: 39.3 FL (ref 37–54)
EOSINOPHIL # BLD AUTO: 0.55 10*3/MM3 (ref 0–0.4)
EOSINOPHIL NFR BLD AUTO: 7.2 % (ref 0.3–6.2)
ERYTHROCYTE [DISTWIDTH] IN BLOOD BY AUTOMATED COUNT: 12 % (ref 12.3–15.4)
HCT VFR BLD AUTO: 35.3 % (ref 34–46.6)
HGB BLD-MCNC: 12.3 G/DL (ref 12–15.9)
IMM GRANULOCYTES # BLD AUTO: 0.11 10*3/MM3 (ref 0–0.05)
IMM GRANULOCYTES NFR BLD AUTO: 1.4 % (ref 0–0.5)
LYMPHOCYTES # BLD AUTO: 2.08 10*3/MM3 (ref 0.7–3.1)
LYMPHOCYTES NFR BLD AUTO: 27.2 % (ref 19.6–45.3)
MCH RBC QN AUTO: 31.1 PG (ref 26.6–33)
MCHC RBC AUTO-ENTMCNC: 34.8 G/DL (ref 31.5–35.7)
MCV RBC AUTO: 89.1 FL (ref 79–97)
MONOCYTES # BLD AUTO: 0.55 10*3/MM3 (ref 0.1–0.9)
MONOCYTES NFR BLD AUTO: 7.2 % (ref 5–12)
NEUTROPHILS NFR BLD AUTO: 4.28 10*3/MM3 (ref 1.7–7)
NEUTROPHILS NFR BLD AUTO: 56.1 % (ref 42.7–76)
NRBC BLD AUTO-RTO: 0 /100 WBC (ref 0–0.2)
PLATELET # BLD AUTO: 178 10*3/MM3 (ref 140–450)
PMV BLD AUTO: 10.7 FL (ref 6–12)
RBC # BLD AUTO: 3.96 10*6/MM3 (ref 3.77–5.28)
WBC # BLD AUTO: 7.64 10*3/MM3 (ref 3.4–10.8)

## 2020-08-25 PROCEDURE — 36415 COLL VENOUS BLD VENIPUNCTURE: CPT

## 2020-08-25 PROCEDURE — 85025 COMPLETE CBC W/AUTO DIFF WBC: CPT

## 2020-08-25 PROCEDURE — 99205 OFFICE O/P NEW HI 60 MIN: CPT | Performed by: INTERNAL MEDICINE

## 2020-08-25 PROCEDURE — 99024 POSTOP FOLLOW-UP VISIT: CPT | Performed by: SURGERY

## 2020-08-25 RX ORDER — IBUPROFEN 800 MG/1
800 TABLET ORAL EVERY 6 HOURS PRN
COMMUNITY
End: 2021-05-07

## 2020-08-25 NOTE — TELEPHONE ENCOUNTER
Patient wants to know if can do chemo treatment with IV instead of the port. Her callback is [phone]

## 2020-08-25 NOTE — PATIENT INSTRUCTIONS
Pt here for counseling and a re requisition order will be sent to [facility] for a 84 gene multi cancer panel. In addition, additional genes not on the breast cancer panel that are on the breast and gynecologic cancer panel and colorectal cancer panel will be pursued.

## 2020-08-25 NOTE — TELEPHONE ENCOUNTER
Patient wants to know if could get treatments at [facility] in [place]. Her callback is [phone]

## 2020-08-25 NOTE — TELEPHONE ENCOUNTER
Dr. Lester,     Pt has decided she wants to be treated closer to home. She is close to [facility]. She would like for us to make a referral to them but she wants to know which doc you recommend? I told her this would mean they take over her care and would no longer f/u with you and she said she appreciated you but that it would be closer and easier for her to be treated there. Its over 2 hours for her to get here. Can you let us know if you have a preference of which doc she will see at [facility] oncology?      Patient wants to know if could get treatments at [facility] in [place]. Her callback is [phone]

## 2020-08-25 NOTE — PROGRESS NOTES
Chief Complaint: Tiera Grove is a  40 y.o. female, initially referred by No ref. provider found , who is here today for a postoperative visit.    History of Present Illness:  In the interim,Tiera Grove has had the following procedure and resultant pathology report: She has undergone bilateral mastectomies with a left axillary lymph node dissection.  She had 3+ lymph nodes and her tumor measured 30 cm with 50 mm of DCIS.  All the margins were clear.  She has an appointment with the medical oncologist today.    She has noted no redness, warmth,drainage, swelling at the incision site. Denies fever or chills.      Current Outpatient Medications:   •  acetaminophen (TYLENOL) 325 MG tablet, Take 2 tablets by mouth Every 4 (Four) Hours As Needed for Mild Pain ., Disp: 60 tablet, Rfl: 0  •  cyclobenzaprine (FLEXERIL) 5 MG tablet, Take 1 tablet by mouth 3 (Three) Times a Day As Needed for Muscle Spasms., Disp: 20 tablet, Rfl: 1  •  docusate sodium (COLACE) 250 MG capsule, Take 1 capsule by mouth 2 (Two) Times a Day As Needed for Constipation., Disp: 60 capsule, Rfl: 1  •  gabapentin (Neurontin) 100 MG capsule, Take 1 capsule by mouth Every 8 (Eight) Hours., Disp: 60 capsule, Rfl: 1  •  HYDROcodone-acetaminophen (NORCO)  MG per tablet, Take 1 tablet by mouth Every 4 (Four) Hours As Needed for Moderate Pain  (Pain)., Disp: 20 tablet, Rfl: 0  •  ondansetron (Zofran) 4 MG tablet, Take 1 tablet by mouth Every 6 (Six) Hours As Needed for Nausea or Vomiting., Disp: 10 tablet, Rfl: 1  •  sennosides-docusate (PERICOLACE) 8.6-50 MG per tablet, Take 2 tablets by mouth Daily As Needed for Constipation., Disp: 30 tablet, Rfl: 1  •  vitamin B-12 (CYANOCOBALAMIN) 1000 MCG tablet, Take 1,000 mcg by mouth Daily., Disp: , Rfl:   •  vitamin D (ERGOCALCIFEROL) 1.25 MG (35300 UT) capsule capsule, Take 50,000 Units by mouth 1 (One) Time Per Week. FRIDAYS, Disp: , Rfl:   Physical examination  Chest wall- both incisions look fine  today with no significant wound healing problems and no signs of infection.  Assessment:  Left breast cancer status post bilateral mastectomies with a left axillary lymph node dissection.  She did have immediate reconstruction and is following closely with the plastic surgeons.  I will make arrangements for her to see the lymphedema clinic.  I demonstrated the wall walking exercises.    Plan:  She will begin the wall walking exercises when the plastic surgeons say it is okay.  I will see her a week after she begins those.  We also talked about what is involved with placement of a MediPort.          EMR Dragon/transcription disclaimer:    Much of this encounter note is an electronic transcription/translocation of spoken language to printed text.  The electronic translation of spoken language may permit erroneous, or at times, nonsensical words or phrases to be inadvertently transcribed.  Although I have reviewed the note from such areas, some may still exist.

## 2020-08-26 ENCOUNTER — TELEPHONE (OUTPATIENT)
Dept: ONCOLOGY | Facility: CLINIC | Age: 40
End: 2020-08-26

## 2020-08-26 ENCOUNTER — PREP FOR SURGERY (OUTPATIENT)
Dept: OTHER | Facility: HOSPITAL | Age: 40
End: 2020-08-26

## 2020-08-26 ENCOUNTER — TELEPHONE (OUTPATIENT)
Dept: MAMMOGRAPHY | Facility: CLINIC | Age: 40
End: 2020-08-26

## 2020-08-26 ENCOUNTER — TELEPHONE (OUTPATIENT)
Dept: ONCOLOGY | Facility: HOSPITAL | Age: 40
End: 2020-08-26

## 2020-08-26 DIAGNOSIS — C50.412 MALIGNANT NEOPLASM OF UPPER-OUTER QUADRANT OF LEFT BREAST IN FEMALE, ESTROGEN RECEPTOR POSITIVE (HCC): Primary | ICD-10-CM

## 2020-08-26 DIAGNOSIS — Z17.0 MALIGNANT NEOPLASM OF UPPER-OUTER QUADRANT OF LEFT BREAST IN FEMALE, ESTROGEN RECEPTOR POSITIVE (HCC): Primary | ICD-10-CM

## 2020-08-26 RX ORDER — CEFAZOLIN SODIUM 2 G/100ML
2 INJECTION, SOLUTION INTRAVENOUS ONCE
Status: CANCELLED | OUTPATIENT
Start: 2020-09-09 | End: 2020-08-26

## 2020-08-26 NOTE — TELEPHONE ENCOUNTER
Dr. Lester is requesting a port to be placed. I have the patient scheduled. Would you let me know when you have placed the case request for a port? Thank you, Henrietta

## 2020-08-26 NOTE — TELEPHONE ENCOUNTER
Patient calling with questions about her type of cancer and questions about her her2 after seeing the oncologist. I was not comfortable answering these as I do not want to get anything wrong. Can someone please call her asap

## 2020-08-26 NOTE — TELEPHONE ENCOUNTER
Spoke with pt again. She has decided she wants to wait and talk to another doc before she makes any decisions. Once she hears from them, she will decide if she wants to continue here or go there. Dr. Lester is ok with making a referral if needed.       ----- Message from Jeremias Lester MD sent at 8/25/2020  6:19 PM EDT -----  I do not know them at all!!  I do not even know their names  ----- Message -----  From: Renata Villafana RN  Sent: 8/25/2020   4:25 PM EDT  To: Jeremias Lester MD, Oncology Nurses    Dr. Lester,     Pt has decided she wants to be treated closer to home. She is close to [facility]. She would like for us to make a referral to them but she wants to know which doc you recommend? I told her this would mean they take over her care and would no longer f/u with you and she said she appreciated you but that it would be closer and easier for her to be treated there. Its over 2 hours for her to get here. Can you let us know if you have a preference of which doc she will see at [facility] oncology?

## 2020-08-26 NOTE — TELEPHONE ENCOUNTER
Patient would like referral to get radiation closer to her home. Patient wants to also make sure that Dr. Lester will remain her oncologist if she did. Her callback is [phone]

## 2020-08-27 ENCOUNTER — TRANSCRIBE ORDERS (OUTPATIENT)
Dept: SLEEP MEDICINE | Facility: HOSPITAL | Age: 40
End: 2020-08-27

## 2020-08-27 ENCOUNTER — TELEPHONE (OUTPATIENT)
Dept: ONCOLOGY | Facility: CLINIC | Age: 40
End: 2020-08-27

## 2020-08-27 DIAGNOSIS — Z01.818 OTHER SPECIFIED PRE-OPERATIVE EXAMINATION: Primary | ICD-10-CM

## 2020-08-27 NOTE — TELEPHONE ENCOUNTER
Multiple questions. How long is radiation. 5 weeks. What is treatment plan. We do not know yet waiting on onco type. Wants to know if she does radiation at home will Dr li still be her oncologist. That is ok with Dr Li. Also asking about working. 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 She verbalized understanding.

## 2020-08-28 ENCOUNTER — TELEPHONE (OUTPATIENT)
Dept: ONCOLOGY | Facility: CLINIC | Age: 40
End: 2020-08-28

## 2020-08-28 NOTE — TELEPHONE ENCOUNTER
PT CALLED TO RESCHEDULE 9- FOR LATER IN THE DAY ANYTIME AFTER 10 AM. PT TRAVELS 2 HOURS TO THE OFFICE. SHE WANTED TO SEE IF HER FUTURE TXS COULD BE SCHEDULED ON FRIDAYS IF POSSIBLE.    PT PHONE NUMBER:  [phone]

## 2020-09-02 ENCOUNTER — HOSPITAL ENCOUNTER (OUTPATIENT)
Dept: CARDIOLOGY | Facility: HOSPITAL | Age: 40
Discharge: HOME | End: 2020-09-02
Admitting: INTERNAL MEDICINE

## 2020-09-02 ENCOUNTER — HOSPITAL ENCOUNTER (OUTPATIENT)
Dept: CT IMAGING | Facility: HOSPITAL | Age: 40
Discharge: HOME | End: 2020-09-02

## 2020-09-02 ENCOUNTER — HOSPITAL ENCOUNTER (OUTPATIENT)
Dept: NUCLEAR MEDICINE | Facility: HOSPITAL | Age: 40
Discharge: HOME | End: 2020-09-02

## 2020-09-02 VITALS
DIASTOLIC BLOOD PRESSURE: 64 MMHG | HEIGHT: 66 IN | WEIGHT: 162 LBS | BODY MASS INDEX: 26.03 KG/M2 | SYSTOLIC BLOOD PRESSURE: 110 MMHG

## 2020-09-02 DIAGNOSIS — Z17.0 MALIGNANT NEOPLASM OF UPPER-OUTER QUADRANT OF LEFT BREAST IN FEMALE, ESTROGEN RECEPTOR POSITIVE (HCC): ICD-10-CM

## 2020-09-02 DIAGNOSIS — C50.412 MALIGNANT NEOPLASM OF UPPER-OUTER QUADRANT OF LEFT BREAST IN FEMALE, ESTROGEN RECEPTOR POSITIVE (HCC): ICD-10-CM

## 2020-09-02 LAB
BH CV ECHO MEAS - % IVS THICK: 14.3 %
BH CV ECHO MEAS - ACS: 1.9 CM
BH CV ECHO MEAS - AO MAX PG (FULL): 2.2 MMHG
BH CV ECHO MEAS - AO MAX PG: 4.2 MMHG
BH CV ECHO MEAS - AO MEAN PG (FULL): 1 MMHG
BH CV ECHO MEAS - AO MEAN PG: 2 MMHG
BH CV ECHO MEAS - AO V2 MAX: 103 CM/SEC
BH CV ECHO MEAS - AO V2 MEAN: 69.1 CM/SEC
BH CV ECHO MEAS - AO V2 VTI: 20.4 CM
BH CV ECHO MEAS - AVA(I,A): 2.3 CM^2
BH CV ECHO MEAS - AVA(I,D): 2.3 CM^2
BH CV ECHO MEAS - AVA(V,A): 2.2 CM^2
BH CV ECHO MEAS - AVA(V,D): 2.2 CM^2
BH CV ECHO MEAS - BSA(HAYCOCK): 1.9 M^2
BH CV ECHO MEAS - BSA: 1.8 M^2
BH CV ECHO MEAS - BZI_BMI: 26.1 KILOGRAMS/M^2
BH CV ECHO MEAS - BZI_METRIC_HEIGHT: 167.6 CM
BH CV ECHO MEAS - BZI_METRIC_WEIGHT: 73.5 KG
BH CV ECHO MEAS - EDV(MOD-SP2): 69 ML
BH CV ECHO MEAS - EDV(MOD-SP4): 73 ML
BH CV ECHO MEAS - EDV(TEICH): 102.4 ML
BH CV ECHO MEAS - EF(CUBED): 65.4 %
BH CV ECHO MEAS - EF(MOD-BP): 61.2 %
BH CV ECHO MEAS - EF(MOD-SP2): 62.3 %
BH CV ECHO MEAS - EF(MOD-SP4): 60.3 %
BH CV ECHO MEAS - EF(TEICH): 56.9 %
BH CV ECHO MEAS - ESV(MOD-SP2): 26 ML
BH CV ECHO MEAS - ESV(MOD-SP4): 29 ML
BH CV ECHO MEAS - ESV(TEICH): 44.1 ML
BH CV ECHO MEAS - FS: 29.8 %
BH CV ECHO MEAS - IVS/LVPW: 1
BH CV ECHO MEAS - IVSD: 0.7 CM
BH CV ECHO MEAS - IVSS: 0.8 CM
BH CV ECHO MEAS - LAT PEAK E' VEL: 12.8 CM/SEC
BH CV ECHO MEAS - LV DIASTOLIC VOL/BSA (35-75): 39.9 ML/M^2
BH CV ECHO MEAS - LV MASS(C)D: 103.1 GRAMS
BH CV ECHO MEAS - LV MASS(C)DI: 56.4 GRAMS/M^2
BH CV ECHO MEAS - LV MAX PG: 2.1 MMHG
BH CV ECHO MEAS - LV MEAN PG: 1 MMHG
BH CV ECHO MEAS - LV SYSTOLIC VOL/BSA (12-30): 15.9 ML/M^2
BH CV ECHO MEAS - LV V1 MAX: 72.1 CM/SEC
BH CV ECHO MEAS - LV V1 MEAN: 51.1 CM/SEC
BH CV ECHO MEAS - LV V1 VTI: 14.8 CM
BH CV ECHO MEAS - LVIDD: 4.7 CM
BH CV ECHO MEAS - LVIDS: 3.3 CM
BH CV ECHO MEAS - LVLD AP2: 7.2 CM
BH CV ECHO MEAS - LVLD AP4: 7.3 CM
BH CV ECHO MEAS - LVLS AP2: 6 CM
BH CV ECHO MEAS - LVLS AP4: 5.8 CM
BH CV ECHO MEAS - LVOT AREA (M): 3.1 CM^2
BH CV ECHO MEAS - LVOT AREA: 3.1 CM^2
BH CV ECHO MEAS - LVOT DIAM: 2 CM
BH CV ECHO MEAS - LVPWD: 0.7 CM
BH CV ECHO MEAS - MED PEAK E' VEL: 13.1 CM/SEC
BH CV ECHO MEAS - MV A DUR: 0.12 SEC
BH CV ECHO MEAS - MV A MAX VEL: 53.1 CM/SEC
BH CV ECHO MEAS - MV DEC SLOPE: 349 CM/SEC^2
BH CV ECHO MEAS - MV DEC TIME: 0.16 SEC
BH CV ECHO MEAS - MV E MAX VEL: 70.6 CM/SEC
BH CV ECHO MEAS - MV E/A: 1.3
BH CV ECHO MEAS - MV MAX PG: 2.6 MMHG
BH CV ECHO MEAS - MV MEAN PG: 1 MMHG
BH CV ECHO MEAS - MV P1/2T MAX VEL: 71 CM/SEC
BH CV ECHO MEAS - MV P1/2T: 59.6 MSEC
BH CV ECHO MEAS - MV V2 MAX: 81 CM/SEC
BH CV ECHO MEAS - MV V2 MEAN: 59.1 CM/SEC
BH CV ECHO MEAS - MV V2 VTI: 16.9 CM
BH CV ECHO MEAS - MVA P1/2T LCG: 3.1 CM^2
BH CV ECHO MEAS - MVA(P1/2T): 3.7 CM^2
BH CV ECHO MEAS - MVA(VTI): 2.8 CM^2
BH CV ECHO MEAS - PA MAX PG (FULL): 1.5 MMHG
BH CV ECHO MEAS - PA MAX PG: 2.7 MMHG
BH CV ECHO MEAS - PA V2 MAX: 81.4 CM/SEC
BH CV ECHO MEAS - PULM A REVS DUR: 0.12 SEC
BH CV ECHO MEAS - PULM A REVS VEL: 27.9 CM/SEC
BH CV ECHO MEAS - PULM DIAS VEL: 28.2 CM/SEC
BH CV ECHO MEAS - PULM S/D: 1.3
BH CV ECHO MEAS - PULM SYS VEL: 35.5 CM/SEC
BH CV ECHO MEAS - PVA(V,A): 1.5 CM^2
BH CV ECHO MEAS - PVA(V,D): 1.5 CM^2
BH CV ECHO MEAS - QP/QS: 0.56
BH CV ECHO MEAS - RAP SYSTOLE: 3 MMHG
BH CV ECHO MEAS - RV MAX PG: 1.1 MMHG
BH CV ECHO MEAS - RV MEAN PG: 1 MMHG
BH CV ECHO MEAS - RV V1 MAX: 53.5 CM/SEC
BH CV ECHO MEAS - RV V1 MEAN: 37.2 CM/SEC
BH CV ECHO MEAS - RV V1 VTI: 11.5 CM
BH CV ECHO MEAS - RVOT AREA: 2.3 CM^2
BH CV ECHO MEAS - RVOT DIAM: 1.7 CM
BH CV ECHO MEAS - RVSP: 21 MMHG
BH CV ECHO MEAS - SI(CUBED): 37.1 ML/M^2
BH CV ECHO MEAS - SI(LVOT): 25.4 ML/M^2
BH CV ECHO MEAS - SI(MOD-SP2): 23.5 ML/M^2
BH CV ECHO MEAS - SI(MOD-SP4): 24.1 ML/M^2
BH CV ECHO MEAS - SI(TEICH): 31.8 ML/M^2
BH CV ECHO MEAS - SV(CUBED): 67.9 ML
BH CV ECHO MEAS - SV(LVOT): 46.5 ML
BH CV ECHO MEAS - SV(MOD-SP2): 43 ML
BH CV ECHO MEAS - SV(MOD-SP4): 44 ML
BH CV ECHO MEAS - SV(RVOT): 26.1 ML
BH CV ECHO MEAS - SV(TEICH): 58.2 ML
BH CV ECHO MEAS - TAPSE (>1.6): 2 CM2
BH CV ECHO MEAS - TR MAX VEL: 211 CM/SEC
BH CV ECHO MEASUREMENTS AVERAGE E/E' RATIO: 5.45
BH CV XLRA - RV BASE: 2.5 CM
BH CV XLRA - RV LENGTH: 5.9 CM
BH CV XLRA - RV MID: 2.1 CM
BH CV XLRA - TDI S': 10.3 CM/SEC
CREAT BLDA-MCNC: 0.6 MG/DL (ref 0.6–1.3)
LEFT ATRIUM VOLUME INDEX: 12 ML/M2
MAXIMAL PREDICTED HEART RATE: 178 BPM
SINUS: 2.9 CM
STJ: 2.4 CM
STRESS TARGET HR: 151 BPM

## 2020-09-02 PROCEDURE — 0 TECHNETIUM MEDRONATE KIT: Performed by: INTERNAL MEDICINE

## 2020-09-02 PROCEDURE — 25010000002 PERFLUTREN (DEFINITY) 8.476 MG IN SODIUM CHLORIDE 0.9 % 10 ML INJECTION: Performed by: INTERNAL MEDICINE

## 2020-09-02 PROCEDURE — A9503 TC99M MEDRONATE: HCPCS | Performed by: INTERNAL MEDICINE

## 2020-09-02 PROCEDURE — 93356 MYOCRD STRAIN IMG SPCKL TRCK: CPT

## 2020-09-02 PROCEDURE — 93306 TTE W/DOPPLER COMPLETE: CPT | Performed by: INTERNAL MEDICINE

## 2020-09-02 PROCEDURE — 93356 MYOCRD STRAIN IMG SPCKL TRCK: CPT | Performed by: INTERNAL MEDICINE

## 2020-09-02 PROCEDURE — 82565 ASSAY OF CREATININE: CPT

## 2020-09-02 PROCEDURE — 74177 CT ABD & PELVIS W/CONTRAST: CPT

## 2020-09-02 PROCEDURE — 78306 BONE IMAGING WHOLE BODY: CPT

## 2020-09-02 PROCEDURE — 93306 TTE W/DOPPLER COMPLETE: CPT

## 2020-09-02 PROCEDURE — 0 DIATRIZOATE MEGLUMINE & SODIUM PER 1 ML: Performed by: INTERNAL MEDICINE

## 2020-09-02 PROCEDURE — 71260 CT THORAX DX C+: CPT

## 2020-09-02 PROCEDURE — 25010000002 IOPAMIDOL 61 % SOLUTION: Performed by: INTERNAL MEDICINE

## 2020-09-02 RX ORDER — TC 99M MEDRONATE 20 MG/10ML
20.7 INJECTION, POWDER, LYOPHILIZED, FOR SOLUTION INTRAVENOUS
Status: COMPLETED | OUTPATIENT
Start: 2020-09-02 | End: 2020-09-02

## 2020-09-02 RX ADMIN — IOPAMIDOL 100 ML: 612 INJECTION, SOLUTION INTRAVENOUS at 12:28

## 2020-09-02 RX ADMIN — Medication 20.7 MILLICURIE: at 11:45

## 2020-09-02 RX ADMIN — PERFLUTREN 1.5 ML: 6.52 INJECTION, SUSPENSION INTRAVENOUS at 10:00

## 2020-09-02 RX ADMIN — DIATRIZOATE MEGLUMINE AND DIATRIZOATE SODIUM 30 ML: 600; 100 SOLUTION ORAL; RECTAL at 11:20

## 2020-09-03 ENCOUNTER — TELEPHONE (OUTPATIENT)
Dept: MAMMOGRAPHY | Facility: CLINIC | Age: 40
End: 2020-09-03

## 2020-09-03 ENCOUNTER — TELEPHONE (OUTPATIENT)
Dept: ONCOLOGY | Facility: CLINIC | Age: 40
End: 2020-09-03

## 2020-09-03 NOTE — TELEPHONE ENCOUNTER
Patient calling to discuss multiple issues including but not limited to treatment plan, hair loss, what she needs to bring, etc.    Her callback is [phone]

## 2020-09-04 LAB
CYTO UR: NORMAL
LAB AP CASE REPORT: NORMAL
LAB AP DIAGNOSIS COMMENT: NORMAL
LAB AP SYNOPTIC CHECKLIST: NORMAL
Lab: NORMAL
Lab: NORMAL
PATH REPORT.ADDENDUM SPEC: NORMAL
PATH REPORT.FINAL DX SPEC: NORMAL
PATH REPORT.GROSS SPEC: NORMAL

## 2020-09-07 ENCOUNTER — LAB (OUTPATIENT)
Dept: LAB | Facility: HOSPITAL | Age: 40
End: 2020-09-07

## 2020-09-07 DIAGNOSIS — Z01.818 OTHER SPECIFIED PRE-OPERATIVE EXAMINATION: ICD-10-CM

## 2020-09-07 PROCEDURE — U0004 COV-19 TEST NON-CDC HGH THRU: HCPCS

## 2020-09-07 PROCEDURE — C9803 HOPD COVID-19 SPEC COLLECT: HCPCS

## 2020-09-08 LAB — SARS-COV-2 RNA RESP QL NAA+PROBE: NOT DETECTED

## 2020-09-08 RX ORDER — ACETAMINOPHEN 500 MG
500 TABLET ORAL EVERY 6 HOURS PRN
COMMUNITY
End: 2021-05-07

## 2020-09-09 ENCOUNTER — APPOINTMENT (OUTPATIENT)
Dept: GENERAL RADIOLOGY | Facility: HOSPITAL | Age: 40
End: 2020-09-09

## 2020-09-09 ENCOUNTER — ANESTHESIA (OUTPATIENT)
Dept: PERIOP | Facility: HOSPITAL | Age: 40
End: 2020-09-09

## 2020-09-09 ENCOUNTER — HOSPITAL ENCOUNTER (OUTPATIENT)
Facility: HOSPITAL | Age: 40
Setting detail: HOSPITAL OUTPATIENT SURGERY
Discharge: HOME | End: 2020-09-09
Attending: SURGERY | Admitting: SURGERY

## 2020-09-09 ENCOUNTER — ANESTHESIA EVENT (OUTPATIENT)
Dept: PERIOP | Facility: HOSPITAL | Age: 40
End: 2020-09-09

## 2020-09-09 VITALS
HEART RATE: 87 BPM | WEIGHT: 162.3 LBS | HEIGHT: 66 IN | DIASTOLIC BLOOD PRESSURE: 69 MMHG | BODY MASS INDEX: 26.08 KG/M2 | RESPIRATION RATE: 16 BRPM | SYSTOLIC BLOOD PRESSURE: 106 MMHG | OXYGEN SATURATION: 99 % | TEMPERATURE: 97.8 F

## 2020-09-09 DIAGNOSIS — Z17.0 MALIGNANT NEOPLASM OF UPPER-OUTER QUADRANT OF LEFT BREAST IN FEMALE, ESTROGEN RECEPTOR POSITIVE (HCC): ICD-10-CM

## 2020-09-09 DIAGNOSIS — C50.412 MALIGNANT NEOPLASM OF UPPER-OUTER QUADRANT OF LEFT BREAST IN FEMALE, ESTROGEN RECEPTOR POSITIVE (HCC): ICD-10-CM

## 2020-09-09 LAB
ANION GAP SERPL CALCULATED.3IONS-SCNC: 10.3 MMOL/L (ref 5–15)
B-HCG UR QL: NEGATIVE
BUN SERPL-MCNC: 8 MG/DL (ref 6–20)
BUN/CREAT SERPL: 16 (ref 7–25)
CALCIUM SPEC-SCNC: 8.9 MG/DL (ref 8.6–10.5)
CHLORIDE SERPL-SCNC: 103 MMOL/L (ref 98–107)
CO2 SERPL-SCNC: 28.7 MMOL/L (ref 22–29)
CREAT SERPL-MCNC: 0.5 MG/DL (ref 0.57–1)
GFR SERPL CREATININE-BSD FRML MDRD: 135 ML/MIN/1.73
GLUCOSE SERPL-MCNC: 82 MG/DL (ref 65–99)
INTERNAL NEGATIVE CONTROL: NEGATIVE
INTERNAL POSITIVE CONTROL: POSITIVE
Lab: NORMAL
POTASSIUM SERPL-SCNC: 3.8 MMOL/L (ref 3.5–5.2)
SODIUM SERPL-SCNC: 142 MMOL/L (ref 136–145)

## 2020-09-09 PROCEDURE — 77001 FLUOROGUIDE FOR VEIN DEVICE: CPT | Performed by: SURGERY

## 2020-09-09 PROCEDURE — 25010000002 KETOROLAC TROMETHAMINE PER 15 MG: Performed by: NURSE ANESTHETIST, CERTIFIED REGISTERED

## 2020-09-09 PROCEDURE — 80048 BASIC METABOLIC PNL TOTAL CA: CPT | Performed by: SURGERY

## 2020-09-09 PROCEDURE — 25010000002 MIDAZOLAM PER 1 MG: Performed by: ANESTHESIOLOGY

## 2020-09-09 PROCEDURE — 25010000003 CEFAZOLIN IN DEXTROSE 2-4 GM/100ML-% SOLUTION: Performed by: SURGERY

## 2020-09-09 PROCEDURE — 36561 INSERT TUNNELED CV CATH: CPT | Performed by: SURGERY

## 2020-09-09 PROCEDURE — 25010000002 PROPOFOL 10 MG/ML EMULSION: Performed by: NURSE ANESTHETIST, CERTIFIED REGISTERED

## 2020-09-09 PROCEDURE — C1788 PORT, INDWELLING, IMP: HCPCS | Performed by: SURGERY

## 2020-09-09 PROCEDURE — 25010000002 ONDANSETRON PER 1 MG: Performed by: NURSE ANESTHETIST, CERTIFIED REGISTERED

## 2020-09-09 PROCEDURE — 25010000002 HEPARIN (PORCINE) PER 1000 UNITS: Performed by: SURGERY

## 2020-09-09 PROCEDURE — 81025 URINE PREGNANCY TEST: CPT | Performed by: ANESTHESIOLOGY

## 2020-09-09 PROCEDURE — 77001 FLUOROGUIDE FOR VEIN DEVICE: CPT

## 2020-09-09 PROCEDURE — 25010000002 FENTANYL CITRATE (PF) 100 MCG/2ML SOLUTION: Performed by: NURSE ANESTHETIST, CERTIFIED REGISTERED

## 2020-09-09 PROCEDURE — 25010000002 DEXAMETHASONE PER 1 MG: Performed by: NURSE ANESTHETIST, CERTIFIED REGISTERED

## 2020-09-09 DEVICE — POWERPORT CLEARVUE IMPLANTABLE PORT WITH ATTACHABLE 8F POLYURETHANE OPEN-ENDED SINGLE-LUMEN VENOUS CATHETER INTERMEDIATE KIT
Type: IMPLANTABLE DEVICE | Status: NON-FUNCTIONAL
Brand: POWERPORT CLEARVUE
Removed: 2021-05-10

## 2020-09-09 RX ORDER — DEXAMETHASONE SODIUM PHOSPHATE 10 MG/ML
INJECTION INTRAMUSCULAR; INTRAVENOUS AS NEEDED
Status: DISCONTINUED | OUTPATIENT
Start: 2020-09-09 | End: 2020-09-09 | Stop reason: SURG

## 2020-09-09 RX ORDER — HYDROCODONE BITARTRATE AND ACETAMINOPHEN 5; 325 MG/1; MG/1
1-2 TABLET ORAL EVERY 4 HOURS PRN
Qty: 8 TABLET | Refills: 0 | Status: SHIPPED | OUTPATIENT
Start: 2020-09-09 | End: 2021-05-07

## 2020-09-09 RX ORDER — ONDANSETRON 2 MG/ML
4 INJECTION INTRAMUSCULAR; INTRAVENOUS ONCE AS NEEDED
Status: DISCONTINUED | OUTPATIENT
Start: 2020-09-09 | End: 2020-09-09 | Stop reason: HOSPADM

## 2020-09-09 RX ORDER — DIPHENHYDRAMINE HCL 25 MG
25 CAPSULE ORAL
Status: DISCONTINUED | OUTPATIENT
Start: 2020-09-09 | End: 2020-09-09 | Stop reason: HOSPADM

## 2020-09-09 RX ORDER — FENTANYL CITRATE 50 UG/ML
INJECTION, SOLUTION INTRAMUSCULAR; INTRAVENOUS AS NEEDED
Status: DISCONTINUED | OUTPATIENT
Start: 2020-09-09 | End: 2020-09-09 | Stop reason: SURG

## 2020-09-09 RX ORDER — PROMETHAZINE HYDROCHLORIDE 25 MG/1
25 TABLET ORAL ONCE AS NEEDED
Status: DISCONTINUED | OUTPATIENT
Start: 2020-09-09 | End: 2020-09-09 | Stop reason: HOSPADM

## 2020-09-09 RX ORDER — PROPOFOL 10 MG/ML
VIAL (ML) INTRAVENOUS AS NEEDED
Status: DISCONTINUED | OUTPATIENT
Start: 2020-09-09 | End: 2020-09-09 | Stop reason: SURG

## 2020-09-09 RX ORDER — MIDAZOLAM HYDROCHLORIDE 1 MG/ML
1 INJECTION INTRAMUSCULAR; INTRAVENOUS
Status: DISCONTINUED | OUTPATIENT
Start: 2020-09-09 | End: 2020-09-09 | Stop reason: HOSPADM

## 2020-09-09 RX ORDER — KETOROLAC TROMETHAMINE 30 MG/ML
INJECTION, SOLUTION INTRAMUSCULAR; INTRAVENOUS AS NEEDED
Status: DISCONTINUED | OUTPATIENT
Start: 2020-09-09 | End: 2020-09-09 | Stop reason: SURG

## 2020-09-09 RX ORDER — SODIUM CHLORIDE 0.9 % (FLUSH) 0.9 %
3 SYRINGE (ML) INJECTION EVERY 12 HOURS SCHEDULED
Status: DISCONTINUED | OUTPATIENT
Start: 2020-09-09 | End: 2020-09-09 | Stop reason: HOSPADM

## 2020-09-09 RX ORDER — LABETALOL HYDROCHLORIDE 5 MG/ML
5 INJECTION, SOLUTION INTRAVENOUS
Status: DISCONTINUED | OUTPATIENT
Start: 2020-09-09 | End: 2020-09-09 | Stop reason: HOSPADM

## 2020-09-09 RX ORDER — SODIUM CHLORIDE, SODIUM LACTATE, POTASSIUM CHLORIDE, CALCIUM CHLORIDE 600; 310; 30; 20 MG/100ML; MG/100ML; MG/100ML; MG/100ML
9 INJECTION, SOLUTION INTRAVENOUS CONTINUOUS
Status: DISCONTINUED | OUTPATIENT
Start: 2020-09-09 | End: 2020-09-09 | Stop reason: HOSPADM

## 2020-09-09 RX ORDER — FLUMAZENIL 0.1 MG/ML
0.2 INJECTION INTRAVENOUS AS NEEDED
Status: DISCONTINUED | OUTPATIENT
Start: 2020-09-09 | End: 2020-09-09 | Stop reason: HOSPADM

## 2020-09-09 RX ORDER — NALOXONE HCL 0.4 MG/ML
0.2 VIAL (ML) INJECTION AS NEEDED
Status: DISCONTINUED | OUTPATIENT
Start: 2020-09-09 | End: 2020-09-09 | Stop reason: HOSPADM

## 2020-09-09 RX ORDER — DIPHENHYDRAMINE HYDROCHLORIDE 50 MG/ML
12.5 INJECTION INTRAMUSCULAR; INTRAVENOUS
Status: DISCONTINUED | OUTPATIENT
Start: 2020-09-09 | End: 2020-09-09 | Stop reason: HOSPADM

## 2020-09-09 RX ORDER — LIDOCAINE HYDROCHLORIDE 10 MG/ML
0.5 INJECTION, SOLUTION EPIDURAL; INFILTRATION; INTRACAUDAL; PERINEURAL ONCE AS NEEDED
Status: DISCONTINUED | OUTPATIENT
Start: 2020-09-09 | End: 2020-09-09 | Stop reason: HOSPADM

## 2020-09-09 RX ORDER — PROMETHAZINE HYDROCHLORIDE 25 MG/1
25 SUPPOSITORY RECTAL ONCE AS NEEDED
Status: DISCONTINUED | OUTPATIENT
Start: 2020-09-09 | End: 2020-09-09 | Stop reason: HOSPADM

## 2020-09-09 RX ORDER — SODIUM CHLORIDE 0.9 % (FLUSH) 0.9 %
3-10 SYRINGE (ML) INJECTION AS NEEDED
Status: DISCONTINUED | OUTPATIENT
Start: 2020-09-09 | End: 2020-09-09 | Stop reason: HOSPADM

## 2020-09-09 RX ORDER — CEFAZOLIN SODIUM 2 G/100ML
2 INJECTION, SOLUTION INTRAVENOUS ONCE
Status: COMPLETED | OUTPATIENT
Start: 2020-09-09 | End: 2020-09-09

## 2020-09-09 RX ORDER — FENTANYL CITRATE 50 UG/ML
50 INJECTION, SOLUTION INTRAMUSCULAR; INTRAVENOUS
Status: DISCONTINUED | OUTPATIENT
Start: 2020-09-09 | End: 2020-09-09 | Stop reason: HOSPADM

## 2020-09-09 RX ORDER — HYDROCODONE BITARTRATE AND ACETAMINOPHEN 7.5; 325 MG/1; MG/1
1 TABLET ORAL ONCE AS NEEDED
Status: COMPLETED | OUTPATIENT
Start: 2020-09-09 | End: 2020-09-09

## 2020-09-09 RX ORDER — ONDANSETRON 2 MG/ML
INJECTION INTRAMUSCULAR; INTRAVENOUS AS NEEDED
Status: DISCONTINUED | OUTPATIENT
Start: 2020-09-09 | End: 2020-09-09 | Stop reason: SURG

## 2020-09-09 RX ORDER — MAGNESIUM HYDROXIDE 1200 MG/15ML
LIQUID ORAL AS NEEDED
Status: DISCONTINUED | OUTPATIENT
Start: 2020-09-09 | End: 2020-09-09 | Stop reason: HOSPADM

## 2020-09-09 RX ORDER — HYDRALAZINE HYDROCHLORIDE 20 MG/ML
5 INJECTION INTRAMUSCULAR; INTRAVENOUS
Status: DISCONTINUED | OUTPATIENT
Start: 2020-09-09 | End: 2020-09-09 | Stop reason: HOSPADM

## 2020-09-09 RX ORDER — OXYCODONE AND ACETAMINOPHEN 7.5; 325 MG/1; MG/1
1 TABLET ORAL ONCE AS NEEDED
Status: DISCONTINUED | OUTPATIENT
Start: 2020-09-09 | End: 2020-09-09 | Stop reason: HOSPADM

## 2020-09-09 RX ORDER — EPHEDRINE SULFATE 50 MG/ML
5 INJECTION, SOLUTION INTRAVENOUS ONCE AS NEEDED
Status: DISCONTINUED | OUTPATIENT
Start: 2020-09-09 | End: 2020-09-09 | Stop reason: HOSPADM

## 2020-09-09 RX ORDER — LIDOCAINE HYDROCHLORIDE 20 MG/ML
INJECTION, SOLUTION INFILTRATION; PERINEURAL AS NEEDED
Status: DISCONTINUED | OUTPATIENT
Start: 2020-09-09 | End: 2020-09-09 | Stop reason: SURG

## 2020-09-09 RX ORDER — HYDROMORPHONE HYDROCHLORIDE 1 MG/ML
0.5 INJECTION, SOLUTION INTRAMUSCULAR; INTRAVENOUS; SUBCUTANEOUS
Status: DISCONTINUED | OUTPATIENT
Start: 2020-09-09 | End: 2020-09-09 | Stop reason: HOSPADM

## 2020-09-09 RX ADMIN — FENTANYL CITRATE 25 MCG: 50 INJECTION INTRAMUSCULAR; INTRAVENOUS at 07:50

## 2020-09-09 RX ADMIN — KETOROLAC TROMETHAMINE 30 MG: 30 INJECTION, SOLUTION INTRAMUSCULAR; INTRAVENOUS at 08:15

## 2020-09-09 RX ADMIN — DEXAMETHASONE SODIUM PHOSPHATE 8 MG: 10 INJECTION INTRAMUSCULAR; INTRAVENOUS at 07:49

## 2020-09-09 RX ADMIN — HYDROCODONE BITARTRATE AND ACETAMINOPHEN 1 TABLET: 7.5; 325 TABLET ORAL at 09:02

## 2020-09-09 RX ADMIN — MIDAZOLAM 1 MG: 1 INJECTION INTRAMUSCULAR; INTRAVENOUS at 07:26

## 2020-09-09 RX ADMIN — PROPOFOL 180 MG: 10 INJECTION, EMULSION INTRAVENOUS at 07:38

## 2020-09-09 RX ADMIN — FENTANYL CITRATE 25 MCG: 50 INJECTION INTRAMUSCULAR; INTRAVENOUS at 07:49

## 2020-09-09 RX ADMIN — ONDANSETRON HYDROCHLORIDE 4 MG: 2 SOLUTION INTRAMUSCULAR; INTRAVENOUS at 08:11

## 2020-09-09 RX ADMIN — CEFAZOLIN SODIUM 2 G: 2 INJECTION, SOLUTION INTRAVENOUS at 07:42

## 2020-09-09 RX ADMIN — LIDOCAINE HYDROCHLORIDE 100 MG: 20 INJECTION, SOLUTION INFILTRATION; PERINEURAL at 07:38

## 2020-09-09 RX ADMIN — FENTANYL CITRATE 50 MCG: 50 INJECTION INTRAMUSCULAR; INTRAVENOUS at 07:38

## 2020-09-09 RX ADMIN — SODIUM CHLORIDE, POTASSIUM CHLORIDE, SODIUM LACTATE AND CALCIUM CHLORIDE: 600; 310; 30; 20 INJECTION, SOLUTION INTRAVENOUS at 07:32

## 2020-09-09 NOTE — DISCHARGE INSTRUCTIONS
You were given Norco (pain pill) today at 9:02 AM.  You may have the next dose at 1:00 pm today.

## 2020-09-09 NOTE — INTERVAL H&P NOTE
H&P updated. The patient was examined and the following changes are noted:  The patient has no signs of infection at the proposed operative site.  Vital signs reveal a temperature of 98.3, blood pressure 100/68, pulse 80.  The patient is aware of the risk of infection, bleeding, anesthesia, or pneumothorax.  She wishes to proceed.

## 2020-09-09 NOTE — ANESTHESIA POSTPROCEDURE EVALUATION
"Patient: Tiera Grove    Procedure Summary     Date:  09/09/20 Room / Location:  [facility] OR  / [facility] MAIN OR    Anesthesia Start:  0732 Anesthesia Stop:  0847    Procedure:  INSERTION VENOUS ACCESS DEVICE (Right ) Diagnosis:       Malignant neoplasm of upper-outer quadrant of left breast in female, estrogen receptor positive (CMS/HCC)      (Malignant neoplasm of upper-outer quadrant of left breast in female, estrogen receptor positive (CMS/HCC) [C50.412, Z17.0])    Surgeon:  Dirk Fenton MD Provider:  Marc Dupont MD    Anesthesia Type:  MAC ASA Status:  2          Anesthesia Type: MAC    Vitals  Vitals Value Taken Time   /72 9/9/2020  9:45 AM   Temp 36.6 °C (97.8 °F) 9/9/2020  8:45 AM   Pulse 87 9/9/2020  9:45 AM   Resp 16 9/9/2020  9:45 AM   SpO2 99 % 9/9/2020  9:45 AM           Post Anesthesia Care and Evaluation    Patient location during evaluation: bedside  Patient participation: complete - patient participated  Level of consciousness: awake and alert  Pain management: adequate  Airway patency: patent  Anesthetic complications: No anesthetic complications  PONV Status: none  Cardiovascular status: acceptable  Respiratory status: acceptable  Hydration status: acceptable    Comments: /72   Pulse 87   Temp 36.6 °C (97.8 °F) (Oral)   Resp 16   Ht 167.6 cm (66\")   Wt 73.6 kg (162 lb 4.8 oz)   SpO2 99%   Breastfeeding No   BMI 26.20 kg/m²         "

## 2020-09-09 NOTE — ANESTHESIA PREPROCEDURE EVALUATION
Anesthesia Evaluation     Patient summary reviewed and Nursing notes reviewed   no history of anesthetic complications:  NPO Solid Status: > 8 hours  NPO Liquid Status: > 8 hours           Airway   Mallampati: II  TM distance: >3 FB  Neck ROM: full  No difficulty expected  Dental - normal exam     Pulmonary    (-) COPD, asthma, sleep apnea, rhonchi, decreased breath sounds, wheezes, not a smoker  Cardiovascular   Exercise tolerance: good (4-7 METS)    Rhythm: regular  Rate: normal    (-) hypertension, CAD, angina, GIVENS, murmur    ROS comment: TTE EF 61% no valvular issues     Neuro/Psych  (-) seizures, CVA  GI/Hepatic/Renal/Endo    (-) liver disease, no renal disease, diabetes, no thyroid disorder    Musculoskeletal     Abdominal     Abdomen: soft.   Substance History      OB/GYN          Other      history of cancer (breast s/p bilateral mastectomy ) active                    Anesthesia Plan    ASA 2     MAC   total IV anesthesia(I believe pt would be fine with MAC but general is requested will discuss with Dr Fenton pt is ok with either)  intravenous induction     Anesthetic plan, all risks, benefits, and alternatives have been provided, discussed and informed consent has been obtained with: patient.

## 2020-09-09 NOTE — OP NOTE
Name: Tiera Grove  Age: 40 y.o.  Sex: female  :  1980  MRN: [id number]    Pre- op Dx: poor IV access, left breast cancer    Postop DX:  Same    Procedure: Insertion of Central Venous Port    Surgeon : Dirk Fenton MD    Indications:  vascular access for administration of chemotherapy    Procedure Details   Informed consent was obtained for the procedure, including sedation.  Risks of lung perforation, hemorrhage, arrhythmia, and adverse drug reaction were discussed.     Sterile technique was used.     Under sterile conditions the neck and chest were prepped and covered with a sterile drape. Local anesthesia was applied to the skin and subcutaneous tissues.  A needle was then inserted into the right  subclavian vein.   A guide wire was then passed easily through the catheter. There were no arrhythmias. A dilator and sheath were advanced over the wire and the wire was removed. The catheter was advanced through the sheath and the sheath was removed.  Fluoroscopy was used to position the tip in the superior vena cava. There was no evidence of a pneumothorax.   A pocket was developed on the anterior chest wall.  The catheter was trimmed to the appropriate length.  The catheter was attached to the port in the usual fashion.  The port was anchored to the fascia with prolene and the skin was closed with vicryl.  Steri-Strips were applied.  Tegaderm was applied    Findings:  There were no changes to vital signs. The port was flushed with heparin. Patient did tolerate procedure well.    EBL:    minimal    Condition:   good

## 2020-09-09 NOTE — ANESTHESIA PROCEDURE NOTES
Airway  Urgency: elective    Date/Time: 9/9/2020 7:39 AM  Airway not difficult    General Information and Staff    Patient location during procedure: OR  Anesthesiologist: Marc Dupont MD  CRNA: Prema Gong CRNA    Indications and Patient Condition    Preoxygenated: yes  Mask difficulty assessment: 0 - not attempted    Final Airway Details  Final airway type: supraglottic airway      Successful airway: LMA  Size 4  Airway Seal Pressure (cm H2O): 8    Number of attempts at approach: 1  Assessment: lips, teeth, and gum same as pre-op and atraumatic intubation

## 2020-09-10 ENCOUNTER — OFFICE VISIT (OUTPATIENT)
Dept: ONCOLOGY | Facility: CLINIC | Age: 40
End: 2020-09-10

## 2020-09-10 DIAGNOSIS — C50.412 MALIGNANT NEOPLASM OF UPPER-OUTER QUADRANT OF LEFT BREAST IN FEMALE, ESTROGEN RECEPTOR POSITIVE (HCC): Primary | ICD-10-CM

## 2020-09-10 DIAGNOSIS — Z17.0 MALIGNANT NEOPLASM OF UPPER-OUTER QUADRANT OF LEFT BREAST IN FEMALE, ESTROGEN RECEPTOR POSITIVE (HCC): Primary | ICD-10-CM

## 2020-09-10 PROBLEM — Z45.2 ENCOUNTER FOR FITTING AND ADJUSTMENT OF VASCULAR CATHETER: Status: ACTIVE | Noted: 2020-09-10

## 2020-09-10 PROCEDURE — 99443 PR PHYS/QHP TELEPHONE EVALUATION 21-30 MIN: CPT | Performed by: NURSE PRACTITIONER

## 2020-09-10 RX ORDER — ONDANSETRON HYDROCHLORIDE 8 MG/1
8 TABLET, FILM COATED ORAL 3 TIMES DAILY PRN
Qty: 30 TABLET | Refills: 5 | Status: SHIPPED | OUTPATIENT
Start: 2020-09-10 | End: 2021-05-07

## 2020-09-10 RX ORDER — HEPARIN SODIUM (PORCINE) LOCK FLUSH IV SOLN 100 UNIT/ML 100 UNIT/ML
500 SOLUTION INTRAVENOUS AS NEEDED
Status: CANCELLED | OUTPATIENT
Start: 2020-09-10

## 2020-09-10 RX ORDER — DEXAMETHASONE 4 MG/1
TABLET ORAL
Qty: 6 TABLET | Refills: 3 | Status: SHIPPED | OUTPATIENT
Start: 2020-09-10 | End: 2021-05-07

## 2020-09-10 RX ORDER — SODIUM CHLORIDE 0.9 % (FLUSH) 0.9 %
10 SYRINGE (ML) INJECTION AS NEEDED
Status: CANCELLED | OUTPATIENT
Start: 2020-09-10

## 2020-09-10 RX ORDER — LIDOCAINE AND PRILOCAINE 25; 25 MG/G; MG/G
CREAM TOPICAL
Qty: 30 G | Refills: 2 | Status: SHIPPED | OUTPATIENT
Start: 2020-09-10 | End: 2021-05-07

## 2020-09-10 NOTE — PROGRESS NOTES
____________________PATIENT EDUCATION____________________    PATIENT EDUCATION:  Today I had a phone discussion with the patient to discuss the chemotherapy regimen recommended for treatment of his disease.  The patient was given explanation of treatment premed side effects including office policy that prohibits patients to drive if sedating medications are administered, MD explanation given regarding benefits, side effects, toxicities and goals of treatment.  The patient received a Chemotherapy/Biotherapy Plan Summary including diagnosis and specific treatment plan.    SIDE EFFECTS:  Common side effects were discussed with the patient.  Discussion included hair loss/discoloration, anemia/fatigue, infection/chills/fever, appetite, bleeding risk/precautions, constipation, diarrhea, mouth sores, taste alteration, loss of appetite,nausea/vomiting, peripheral neuropathy, skin/nail changes, rash, muscle aches/weakness, photosensitivity, weight gain/loss, hearing loss, dizziness, menopausal symptoms, menstrrual irregularity, sterility, high blood pressure, heart damage, liver damage, lung damage, kidney damage, DVT/PE risk, fluid retention, pleural/pericardial effusion, somnolence, electrolyte/LFT imbalance, vein exercises and/or the possible need for vascular access/port placement.  The patient was advice that although uncommon, leakage of an infused medication from the vein or venous access device (port) may lead to skin breakdown and/or other tissue damage.  The patient was advised that he/she may have pain, bleeding, and/or bruising from the insertion of a needle in their vein or venous access device (port).  The patient was further advised that, in spite of proper technique, infection with redness and irritation may rarely occur at the site where the needle was inserted.  The patient was advised that if complications occur, additional medical treatment is available.    Discussion also included side effects specific  to drugs in the treatment plan, specifically Adriamycin, Cytoxan, Neulasta, and Taxol.    Reproductive risks were discussed, including appropriate use of birth control and protection during sexual relations.    Survivorship referral placed if appropriate YES    A total of 60 minutes were spent with the patient, with 100% of time spent in education and counseling via the telephone.      You have chosen to receive care through a telephone visit today. Do you consent to use a telephone visit for your medical care today? Yes     This visit has been rescheduled as a phone visit to comply with patient safety concerns in accordance with CDC recommendations. Total time of discussion was 60 minutes.    67710 is 5-10 minutes  70492 is 11-20 minutes  78396 is 21 or more minutes

## 2020-09-18 ENCOUNTER — TELEPHONE (OUTPATIENT)
Dept: ONCOLOGY | Facility: CLINIC | Age: 40
End: 2020-09-18

## 2020-09-18 ENCOUNTER — APPOINTMENT (OUTPATIENT)
Dept: ONCOLOGY | Facility: HOSPITAL | Age: 40
End: 2020-09-18

## 2020-09-18 NOTE — TELEPHONE ENCOUNTER
PATIENT CALLED TO CHECK ON THIS, PT HAS SWITCHED TO A CLOSER OFFICE IN [facility] WITH DR. LEAH FERNANDEZ, TRYING TO GET TREATMENT FOR MON, NEEDS OFFICE TO CALL INS (JACQU[employer]) AT #[phone] TO CANCEL THE PA'S ON CPT CODES , , ,  AND , SO HIS OFFICE CAN GET THEIR PA'S AS SOON AS POSSIBLE, PLEASE ADVISE?    PT CALL BACK #: [phone]

## 2020-09-18 NOTE — TELEPHONE ENCOUNTER
Marnie calling from [facility] calling.  Patient will be seen there.  She called insurance ([employer]) to get PA, but they said this office already had one.  They need for Dr. Lester's office to call and cancel the PA for treatment.      [phone] Marnie

## 2020-11-18 ENCOUNTER — OUTSIDE FACILITY SERVICE (OUTPATIENT)
Dept: CARDIOLOGY | Facility: CLINIC | Age: 40
End: 2020-11-18

## 2020-11-18 PROCEDURE — 93306 TTE W/DOPPLER COMPLETE: CPT | Performed by: INTERNAL MEDICINE

## 2021-01-08 ENCOUNTER — OFFICE VISIT (OUTPATIENT)
Dept: RADIATION ONCOLOGY | Facility: HOSPITAL | Age: 41
End: 2021-01-08

## 2021-01-08 ENCOUNTER — HOSPITAL ENCOUNTER (OUTPATIENT)
Dept: RADIATION ONCOLOGY | Facility: HOSPITAL | Age: 41
Setting detail: RADIATION/ONCOLOGY SERIES
Discharge: HOME | End: 2021-01-08

## 2021-01-08 VITALS
HEART RATE: 96 BPM | DIASTOLIC BLOOD PRESSURE: 79 MMHG | WEIGHT: 167.8 LBS | SYSTOLIC BLOOD PRESSURE: 118 MMHG | TEMPERATURE: 96.4 F | BODY MASS INDEX: 27.08 KG/M2 | OXYGEN SATURATION: 98 %

## 2021-01-08 DIAGNOSIS — Z17.0 MALIGNANT NEOPLASM OF UPPER-OUTER QUADRANT OF LEFT BREAST IN FEMALE, ESTROGEN RECEPTOR POSITIVE (HCC): ICD-10-CM

## 2021-01-08 DIAGNOSIS — C50.412 MALIGNANT NEOPLASM OF UPPER-OUTER QUADRANT OF LEFT BREAST IN FEMALE, ESTROGEN RECEPTOR POSITIVE (HCC): ICD-10-CM

## 2021-01-08 PROCEDURE — G0463 HOSPITAL OUTPT CLINIC VISIT: HCPCS | Performed by: RADIOLOGY

## 2021-01-08 RX ORDER — PROCHLORPERAZINE MALEATE 10 MG
10 TABLET ORAL EVERY 6 HOURS PRN
COMMUNITY
End: 2021-05-07

## 2021-01-08 RX ORDER — UREA 10 %
LOTION (ML) TOPICAL
COMMUNITY
End: 2021-05-07

## 2021-01-08 NOTE — PROGRESS NOTES
CONSULTATION NOTE    NAME:      Tiera Grove  :                                                          1980  DATE OF CONSULTATION:                       21  REQUESTING PHYSICIAN:                   Néstor Evans MD  REASON FOR CONSULTATION:           Cancer Staging  Malignant neoplasm of upper-outer quadrant of left breast in female, estrogen receptor positive (CMS/HCC)  Staging form: Breast, AJCC 8th Edition  - Clinical: No stage assigned - Unsigned  - Pathologic: pT2, pN1a, G3, ER+, CT+, HER2- - Signed by Jeremias Lester MD on 2020           BRIEF HISTORY:  Tiera Grove  is a very pleasant 40 y.o. female who presented with a palpable mass in the left breast in 2020.  Further evaluation was delayed due to the COVID pandemic.  Diagnostic mammogram with biopsy on 2020 revealed invasive mammary carcinoma, grade 3, 8 mm, ER+ CT+H2N negative.  Bilateral breast MRI shoed a left breast mass at 3 o'clock measuring 2.3 x 1.9 x 2.5 cm abutting the chest wall and 9 mm area of focal attachment with tenting of the pectoralis muscle.    She underwent bilateral mastectomies with sentinel node biopsy followed by axillary dissection on 2020.  She had grade 3 invasive ductal carcinoma measuring 3 cm with invasive cancer invading the pectoralis.  3/11 nodes were positive for tumor.  She underwent immediate reconstructive surgery.  Genetic testing was negative.  She underwent chemotherapy of dose dense AC plus T.  She is here to discuss postoperative radiation and will be placed on tamoxifen after treatment.      No Known Allergies    Social History     Tobacco Use   • Smoking status: Never Smoker   • Smokeless tobacco: Never Used   Substance Use Topics   • Alcohol use: Not Currently     Comment: Occasionally   • Drug use: Never         Past Medical History:   Diagnosis Date   • Anemia    • Breast cancer (CMS/HCC) 2020    Left IDC   • Eczema    • History of iron deficiency  anemia    • History of tension headache    • Vitamin D deficiency        family history includes Alcohol abuse in her father; Asthma in her maternal uncle; Birth defects in her nephew; Breast cancer in her maternal aunt, maternal cousin, maternal cousin, and paternal aunt; Cancer in her maternal aunt; Colon cancer in her maternal grandmother and maternal uncle; Deep vein thrombosis in her father; Depression in her maternal grandfather; Diabetes in her father and paternal grandfather; Hearing loss in her mother; Heart failure in her maternal grandfather and maternal grandmother; Hyperlipidemia in her father and mother; Hypertension in her father; Melanoma in her maternal aunt and maternal uncle.     Past Surgical History:   Procedure Laterality Date   • BREAST BIOPSY Left 2014    benign   • BREAST BIOPSY Left 06/2020    malignant   • BREAST RECONSTRUCTION Bilateral 8/6/2020    Procedure: BILATERAL PREPECTORIAL PLACEMEN OF TISSUE EXPANDERS AND ALLODERM;  Surgeon: Néstor Evans MD;  Location: [facility];  Service: Plastics;  Laterality: Bilateral;   • COLONOSCOPY     • EAR TUBES      SEVERAL TIMES   • ENDOSCOPY     • MASTECTOMY W/ SENTINEL NODE BIOPSY Bilateral 8/6/2020    Procedure: BREAST MASTECTOMY WITH SENTINEL NODE BIOPSY BILATERAL WITH IMMEDIATE RECONSTRUCTION The sentinel lymph node biopsy will be performed on the left side only.  There is a possibility of an axillary dissection;  Surgeon: Dirk Fenton MD;  Location: [facility];  Service: General;  Laterality: Bilateral;   • VENOUS ACCESS DEVICE (PORT) INSERTION Right 9/9/2020    Procedure: INSERTION VENOUS ACCESS DEVICE;  Surgeon: Dirk Fenton MD;  Location: [facility];  Service: General;  Laterality: Right;   • WISDOM TOOTH EXTRACTION          Review of Systems   All other systems reviewed and are negative.          Objective   VITAL SIGNS:   Vitals:    01/08/21 1023   BP: 118/79   Pulse: 96   Temp: 96.4 °F (35.8 °C)    SpO2: 98%   Weight: 76.1 kg (167 lb 12.8 oz)   PainSc: 0-No pain        KPS       90%    Physical Exam  Vitals signs and nursing note reviewed.   Constitutional:       General: She is not in acute distress.     Appearance: She is well-developed.   HENT:      Head: Normocephalic and atraumatic.   Eyes:      General: No scleral icterus.  Neck:      Musculoskeletal: Neck supple.   Cardiovascular:      Rate and Rhythm: Normal rate and regular rhythm.   Pulmonary:      Effort: Pulmonary effort is normal.      Breath sounds: Normal breath sounds.   Lymphadenopathy:      Cervical: No cervical adenopathy.   Neurological:      Mental Status: She is alert and oriented to person, place, and time.     The breast exam reveals bilateral expanders are in place.  She has no masses or suspicious lesions and is healing well.  She has no axillary adenopathy bilaterally.         The following portions of the patient's history were reviewed and updated as appropriate: allergies, current medications, past family history, past medical history, past social history, past surgical history and problem list.    Assessment      IMPRESSION:     Tiera Grove  is a very pleasant 40 y.o. female who underwent bilateral mastectomies with sentinel node biopsy followed by axillary dissection on 8/6/2020.  She had grade 3 invasive ductal carcinoma measuring 3 cm with invasive cancer invading the pectoralis.  3/11 nodes were positive for tumor.  She underwent immediate reconstructive surgery.  Genetic testing was negative.  She completed chemotherapy of dose dense AC plus T.  She is here to discuss postoperative radiation and will be placed on tamoxifen after treatment.          RECOMMENDATIONS: Ms. Grove has 1 more fill into the expanders by Dr. Evans.  I recommend postoperative radiotherapy based on the multiple lymph nodes.  The pros and cons, risks and benefits of treatment were discussed. I anticipate 45 to 50 Saxena in 25 fractions to the  chest wall and regional lymphatics.  There will be no scar boost.  She will call my nurse Mini after the last fill and we will schedule her to return for treatment planning.  We will obtain final consent at that time.  We will utilize deep inspiration breath-hold for cardiac sparing.  Thank you very much for letting me participate in the care of this very pleasant patient.               Gina Michele MD      Errors in dictation may reflect use of voice recognition software and not all errors in transcription may have been detected prior to signing.

## 2021-04-16 ENCOUNTER — TELEPHONE (OUTPATIENT)
Dept: MAMMOGRAPHY | Facility: CLINIC | Age: 41
End: 2021-04-16

## 2021-04-16 NOTE — TELEPHONE ENCOUNTER
Pt would like to come in for port removal.  She has been done with chemo and was told to wait, but it is starting to bother her.  She wants to know your opinion.  Should she have it removed now or continue waiting?    CMA

## 2021-04-16 NOTE — TELEPHONE ENCOUNTER
I called and gave her the option of removing this in the OR under sedation or in the ACU area with local anesthesia.  She wants to think about these options and will call me back next week.

## 2021-04-19 ENCOUNTER — PREP FOR SURGERY (OUTPATIENT)
Dept: OTHER | Facility: HOSPITAL | Age: 41
End: 2021-04-19

## 2021-04-19 DIAGNOSIS — C50.412 MALIGNANT NEOPLASM OF UPPER-OUTER QUADRANT OF LEFT FEMALE BREAST, UNSPECIFIED ESTROGEN RECEPTOR STATUS (HCC): Primary | ICD-10-CM

## 2021-04-19 NOTE — TELEPHONE ENCOUNTER
Actually she would need to be done in the main OR.  Or [facility] we can only do local anesthesia in the ACU

## 2021-04-20 PROBLEM — C50.412 MALIGNANT NEOPLASM OF UPPER-OUTER QUADRANT OF LEFT FEMALE BREAST (HCC): Status: ACTIVE | Noted: 2021-04-20

## 2021-04-22 ENCOUNTER — TRANSCRIBE ORDERS (OUTPATIENT)
Dept: SLEEP MEDICINE | Facility: HOSPITAL | Age: 41
End: 2021-04-22

## 2021-04-22 DIAGNOSIS — Z01.818 OTHER SPECIFIED PRE-OPERATIVE EXAMINATION: Primary | ICD-10-CM

## 2021-05-07 ENCOUNTER — TELEPHONE (OUTPATIENT)
Dept: MAMMOGRAPHY | Facility: CLINIC | Age: 41
End: 2021-05-07

## 2021-05-07 ENCOUNTER — PRE-ADMISSION TESTING (OUTPATIENT)
Dept: PREADMISSION TESTING | Facility: HOSPITAL | Age: 41
End: 2021-05-07

## 2021-05-07 ENCOUNTER — APPOINTMENT (OUTPATIENT)
Dept: LAB | Facility: HOSPITAL | Age: 41
End: 2021-05-07

## 2021-05-07 VITALS
HEIGHT: 66 IN | BODY MASS INDEX: 26.52 KG/M2 | WEIGHT: 165 LBS | TEMPERATURE: 97.5 F | SYSTOLIC BLOOD PRESSURE: 112 MMHG | OXYGEN SATURATION: 100 % | RESPIRATION RATE: 20 BRPM | HEART RATE: 71 BPM | DIASTOLIC BLOOD PRESSURE: 76 MMHG

## 2021-05-07 LAB
ANION GAP SERPL CALCULATED.3IONS-SCNC: 10.5 MMOL/L (ref 5–15)
BUN SERPL-MCNC: 12 MG/DL (ref 6–20)
BUN/CREAT SERPL: 21.8 (ref 7–25)
CALCIUM SPEC-SCNC: 9.4 MG/DL (ref 8.6–10.5)
CHLORIDE SERPL-SCNC: 105 MMOL/L (ref 98–107)
CO2 SERPL-SCNC: 27.5 MMOL/L (ref 22–29)
CREAT SERPL-MCNC: 0.55 MG/DL (ref 0.57–1)
DEPRECATED RDW RBC AUTO: 45 FL (ref 37–54)
ERYTHROCYTE [DISTWIDTH] IN BLOOD BY AUTOMATED COUNT: 13.2 % (ref 12.3–15.4)
GFR SERPL CREATININE-BSD FRML MDRD: 121 ML/MIN/1.73
GLUCOSE SERPL-MCNC: 84 MG/DL (ref 65–99)
HCT VFR BLD AUTO: 37.1 % (ref 34–46.6)
HGB BLD-MCNC: 12.5 G/DL (ref 12–15.9)
MCH RBC QN AUTO: 31.5 PG (ref 26.6–33)
MCHC RBC AUTO-ENTMCNC: 33.7 G/DL (ref 31.5–35.7)
MCV RBC AUTO: 93.5 FL (ref 79–97)
PLATELET # BLD AUTO: 202 10*3/MM3 (ref 140–450)
PMV BLD AUTO: 8.8 FL (ref 6–12)
POTASSIUM SERPL-SCNC: 3.9 MMOL/L (ref 3.5–5.2)
RBC # BLD AUTO: 3.97 10*6/MM3 (ref 3.77–5.28)
SARS-COV-2 ORF1AB RESP QL NAA+PROBE: NOT DETECTED
SODIUM SERPL-SCNC: 143 MMOL/L (ref 136–145)
WBC # BLD AUTO: 3.2 10*3/MM3 (ref 3.4–10.8)

## 2021-05-07 PROCEDURE — C9803 HOPD COVID-19 SPEC COLLECT: HCPCS | Performed by: NURSE PRACTITIONER

## 2021-05-07 PROCEDURE — 36415 COLL VENOUS BLD VENIPUNCTURE: CPT

## 2021-05-07 PROCEDURE — 85027 COMPLETE CBC AUTOMATED: CPT

## 2021-05-07 PROCEDURE — 80048 BASIC METABOLIC PNL TOTAL CA: CPT

## 2021-05-07 PROCEDURE — U0004 COV-19 TEST NON-CDC HGH THRU: HCPCS | Performed by: NURSE PRACTITIONER

## 2021-05-07 RX ORDER — TAMOXIFEN CITRATE 20 MG/1
20 TABLET ORAL DAILY
COMMUNITY

## 2021-05-07 NOTE — TELEPHONE ENCOUNTER
Pt called and asked if she could return to work after having her chemo port removed on 05/10/2021 or if she should take off Tuesday? Per clinical she can return to work the next day.     MB

## 2021-05-10 ENCOUNTER — HOSPITAL ENCOUNTER (OUTPATIENT)
Facility: HOSPITAL | Age: 41
Setting detail: HOSPITAL OUTPATIENT SURGERY
Discharge: HOME | End: 2021-05-10
Attending: SURGERY | Admitting: SURGERY

## 2021-05-10 ENCOUNTER — ANESTHESIA (OUTPATIENT)
Dept: PERIOP | Facility: HOSPITAL | Age: 41
End: 2021-05-10

## 2021-05-10 ENCOUNTER — ANESTHESIA EVENT (OUTPATIENT)
Dept: PERIOP | Facility: HOSPITAL | Age: 41
End: 2021-05-10

## 2021-05-10 VITALS
TEMPERATURE: 97.7 F | SYSTOLIC BLOOD PRESSURE: 105 MMHG | DIASTOLIC BLOOD PRESSURE: 76 MMHG | RESPIRATION RATE: 18 BRPM | HEART RATE: 81 BPM | OXYGEN SATURATION: 99 %

## 2021-05-10 PROCEDURE — 36590 REMOVAL TUNNELED CV CATH: CPT | Performed by: SURGERY

## 2021-05-10 PROCEDURE — 81025 URINE PREGNANCY TEST: CPT | Performed by: ANESTHESIOLOGY

## 2021-05-10 PROCEDURE — 25010000002 PROPOFOL 10 MG/ML EMULSION: Performed by: NURSE ANESTHETIST, CERTIFIED REGISTERED

## 2021-05-10 RX ORDER — PROMETHAZINE HYDROCHLORIDE 25 MG/1
25 SUPPOSITORY RECTAL ONCE AS NEEDED
Status: DISCONTINUED | OUTPATIENT
Start: 2021-05-10 | End: 2021-05-10 | Stop reason: HOSPADM

## 2021-05-10 RX ORDER — HYDRALAZINE HYDROCHLORIDE 20 MG/ML
5 INJECTION INTRAMUSCULAR; INTRAVENOUS
Status: DISCONTINUED | OUTPATIENT
Start: 2021-05-10 | End: 2021-05-10 | Stop reason: HOSPADM

## 2021-05-10 RX ORDER — FENTANYL CITRATE 50 UG/ML
50 INJECTION, SOLUTION INTRAMUSCULAR; INTRAVENOUS
Status: DISCONTINUED | OUTPATIENT
Start: 2021-05-10 | End: 2021-05-10 | Stop reason: HOSPADM

## 2021-05-10 RX ORDER — HYDROCODONE BITARTRATE AND ACETAMINOPHEN 7.5; 325 MG/1; MG/1
1 TABLET ORAL ONCE AS NEEDED
Status: COMPLETED | OUTPATIENT
Start: 2021-05-10 | End: 2021-05-10

## 2021-05-10 RX ORDER — SODIUM CHLORIDE, SODIUM LACTATE, POTASSIUM CHLORIDE, CALCIUM CHLORIDE 600; 310; 30; 20 MG/100ML; MG/100ML; MG/100ML; MG/100ML
9 INJECTION, SOLUTION INTRAVENOUS CONTINUOUS
Status: DISCONTINUED | OUTPATIENT
Start: 2021-05-10 | End: 2021-05-10 | Stop reason: HOSPADM

## 2021-05-10 RX ORDER — SODIUM CHLORIDE 0.9 % (FLUSH) 0.9 %
3 SYRINGE (ML) INJECTION EVERY 12 HOURS SCHEDULED
Status: DISCONTINUED | OUTPATIENT
Start: 2021-05-10 | End: 2021-05-10 | Stop reason: HOSPADM

## 2021-05-10 RX ORDER — LIDOCAINE HYDROCHLORIDE 10 MG/ML
0.5 INJECTION, SOLUTION EPIDURAL; INFILTRATION; INTRACAUDAL; PERINEURAL ONCE AS NEEDED
Status: DISCONTINUED | OUTPATIENT
Start: 2021-05-10 | End: 2021-05-10 | Stop reason: HOSPADM

## 2021-05-10 RX ORDER — MIDAZOLAM HYDROCHLORIDE 1 MG/ML
1 INJECTION INTRAMUSCULAR; INTRAVENOUS
Status: DISCONTINUED | OUTPATIENT
Start: 2021-05-10 | End: 2021-05-10 | Stop reason: HOSPADM

## 2021-05-10 RX ORDER — ONDANSETRON 2 MG/ML
4 INJECTION INTRAMUSCULAR; INTRAVENOUS ONCE AS NEEDED
Status: DISCONTINUED | OUTPATIENT
Start: 2021-05-10 | End: 2021-05-10 | Stop reason: HOSPADM

## 2021-05-10 RX ORDER — LIDOCAINE HYDROCHLORIDE 20 MG/ML
INJECTION, SOLUTION INFILTRATION; PERINEURAL AS NEEDED
Status: DISCONTINUED | OUTPATIENT
Start: 2021-05-10 | End: 2021-05-10 | Stop reason: SURG

## 2021-05-10 RX ORDER — HYDROMORPHONE HYDROCHLORIDE 1 MG/ML
0.5 INJECTION, SOLUTION INTRAMUSCULAR; INTRAVENOUS; SUBCUTANEOUS
Status: DISCONTINUED | OUTPATIENT
Start: 2021-05-10 | End: 2021-05-10 | Stop reason: HOSPADM

## 2021-05-10 RX ORDER — EPHEDRINE SULFATE 50 MG/ML
5 INJECTION, SOLUTION INTRAVENOUS ONCE AS NEEDED
Status: DISCONTINUED | OUTPATIENT
Start: 2021-05-10 | End: 2021-05-10 | Stop reason: HOSPADM

## 2021-05-10 RX ORDER — DIPHENHYDRAMINE HCL 25 MG
25 CAPSULE ORAL
Status: DISCONTINUED | OUTPATIENT
Start: 2021-05-10 | End: 2021-05-10 | Stop reason: HOSPADM

## 2021-05-10 RX ORDER — PROMETHAZINE HYDROCHLORIDE 25 MG/1
25 TABLET ORAL ONCE AS NEEDED
Status: DISCONTINUED | OUTPATIENT
Start: 2021-05-10 | End: 2021-05-10 | Stop reason: HOSPADM

## 2021-05-10 RX ORDER — DIPHENHYDRAMINE HYDROCHLORIDE 50 MG/ML
12.5 INJECTION INTRAMUSCULAR; INTRAVENOUS
Status: DISCONTINUED | OUTPATIENT
Start: 2021-05-10 | End: 2021-05-10 | Stop reason: HOSPADM

## 2021-05-10 RX ORDER — PROPOFOL 10 MG/ML
VIAL (ML) INTRAVENOUS AS NEEDED
Status: DISCONTINUED | OUTPATIENT
Start: 2021-05-10 | End: 2021-05-10 | Stop reason: SURG

## 2021-05-10 RX ORDER — LABETALOL HYDROCHLORIDE 5 MG/ML
5 INJECTION, SOLUTION INTRAVENOUS
Status: DISCONTINUED | OUTPATIENT
Start: 2021-05-10 | End: 2021-05-10 | Stop reason: HOSPADM

## 2021-05-10 RX ORDER — SODIUM CHLORIDE 0.9 % (FLUSH) 0.9 %
3-10 SYRINGE (ML) INJECTION AS NEEDED
Status: DISCONTINUED | OUTPATIENT
Start: 2021-05-10 | End: 2021-05-10 | Stop reason: HOSPADM

## 2021-05-10 RX ORDER — FLUMAZENIL 0.1 MG/ML
0.2 INJECTION INTRAVENOUS AS NEEDED
Status: DISCONTINUED | OUTPATIENT
Start: 2021-05-10 | End: 2021-05-10 | Stop reason: HOSPADM

## 2021-05-10 RX ORDER — OXYCODONE AND ACETAMINOPHEN 7.5; 325 MG/1; MG/1
1 TABLET ORAL ONCE AS NEEDED
Status: DISCONTINUED | OUTPATIENT
Start: 2021-05-10 | End: 2021-05-10 | Stop reason: HOSPADM

## 2021-05-10 RX ORDER — MAGNESIUM HYDROXIDE 1200 MG/15ML
LIQUID ORAL AS NEEDED
Status: DISCONTINUED | OUTPATIENT
Start: 2021-05-10 | End: 2021-05-10 | Stop reason: HOSPADM

## 2021-05-10 RX ORDER — FAMOTIDINE 10 MG/ML
20 INJECTION, SOLUTION INTRAVENOUS ONCE
Status: COMPLETED | OUTPATIENT
Start: 2021-05-10 | End: 2021-05-10

## 2021-05-10 RX ORDER — NALOXONE HCL 0.4 MG/ML
0.2 VIAL (ML) INJECTION AS NEEDED
Status: DISCONTINUED | OUTPATIENT
Start: 2021-05-10 | End: 2021-05-10 | Stop reason: HOSPADM

## 2021-05-10 RX ORDER — PROPOFOL 10 MG/ML
VIAL (ML) INTRAVENOUS CONTINUOUS PRN
Status: DISCONTINUED | OUTPATIENT
Start: 2021-05-10 | End: 2021-05-10 | Stop reason: SURG

## 2021-05-10 RX ADMIN — PROPOFOL 300 MCG/KG/MIN: 10 INJECTION, EMULSION INTRAVENOUS at 11:45

## 2021-05-10 RX ADMIN — FAMOTIDINE 20 MG: 10 INJECTION INTRAVENOUS at 10:34

## 2021-05-10 RX ADMIN — SODIUM CHLORIDE, POTASSIUM CHLORIDE, SODIUM LACTATE AND CALCIUM CHLORIDE 9 ML/HR: 600; 310; 30; 20 INJECTION, SOLUTION INTRAVENOUS at 10:03

## 2021-05-10 RX ADMIN — LIDOCAINE HYDROCHLORIDE 60 MG: 20 INJECTION, SOLUTION INFILTRATION; PERINEURAL at 11:45

## 2021-05-10 RX ADMIN — HYDROCODONE BITARTRATE AND ACETAMINOPHEN 1 TABLET: 7.5; 325 TABLET ORAL at 12:47

## 2021-05-10 RX ADMIN — PROPOFOL 50 MG: 10 INJECTION, EMULSION INTRAVENOUS at 11:45

## 2021-05-10 NOTE — ANESTHESIA POSTPROCEDURE EVALUATION
Patient: Tiera Grove    Procedure Summary     Date: 05/10/21 Room / Location:  [facility]  [facility]  [facility]    Anesthesia Start: 1140 Anesthesia Stop: 1230    Procedure: REMOVAL VENOUS ACCESS DEVICE (Right Chest) Diagnosis:       Malignant neoplasm of upper-outer quadrant of left female breast, unspecified estrogen receptor status (CMS/HCC)      (Malignant neoplasm of upper-outer quadrant of left female breast, unspecified estrogen receptor status (CMS/HCC) [C50.412])    Surgeons: Dirk Fenton MD Provider: Felipe Rich MD    Anesthesia Type: MAC ASA Status: 3          Anesthesia Type: MAC    Vitals  Vitals Value Taken Time   /76 05/10/21 1240   Temp 36.5 °C (97.7 °F) 05/10/21 1226   Pulse 81 05/10/21 1240   Resp 18 05/10/21 1240   SpO2 99 % 05/10/21 1240           Post Anesthesia Care and Evaluation    Patient location during evaluation: bedside  Patient participation: complete - patient participated  Level of consciousness: awake and alert  Pain score: 0  Pain management: adequate  Airway patency: patent  Anesthetic complications: No anesthetic complications  PONV Status: none  Cardiovascular status: acceptable and hemodynamically stable  Respiratory status: acceptable and spontaneous ventilation  Hydration status: acceptable    Comments: /76   Pulse 81   Temp 36.5 °C (97.7 °F) (Oral)   Resp 18   SpO2 99%

## 2021-05-10 NOTE — ANESTHESIA PREPROCEDURE EVALUATION
Anesthesia Evaluation     Patient summary reviewed and Nursing notes reviewed   NPO Solid Status: > 8 hours  NPO Liquid Status: > 2 hours           Airway   Mallampati: II  TM distance: >3 FB  Neck ROM: full  Dental - normal exam     Pulmonary - negative pulmonary ROS and normal exam   Cardiovascular - negative cardio ROS and normal exam    ECG reviewed        Neuro/Psych- negative ROS  GI/Hepatic/Renal/Endo - negative ROS     Musculoskeletal (-) negative ROS    Abdominal    Substance History - negative use     OB/GYN negative ob/gyn ROS         Other                        Anesthesia Plan    ASA 3     MAC       Anesthetic plan, all risks, benefits, and alternatives have been provided, discussed and informed consent has been obtained with: patient.

## 2021-07-30 ENCOUNTER — TELEPHONE (OUTPATIENT)
Dept: MAMMOGRAPHY | Facility: CLINIC | Age: 41
End: 2021-07-30

## 2021-07-30 ENCOUNTER — OFFICE VISIT (OUTPATIENT)
Dept: MAMMOGRAPHY | Facility: CLINIC | Age: 41
End: 2021-07-30

## 2021-07-30 VITALS — SYSTOLIC BLOOD PRESSURE: 118 MMHG | DIASTOLIC BLOOD PRESSURE: 70 MMHG

## 2021-07-30 DIAGNOSIS — C50.412 MALIGNANT NEOPLASM OF UPPER-OUTER QUADRANT OF LEFT BREAST IN FEMALE, ESTROGEN RECEPTOR POSITIVE (HCC): Primary | ICD-10-CM

## 2021-07-30 DIAGNOSIS — Z17.0 MALIGNANT NEOPLASM OF UPPER-OUTER QUADRANT OF LEFT BREAST IN FEMALE, ESTROGEN RECEPTOR POSITIVE (HCC): Primary | ICD-10-CM

## 2021-07-30 PROCEDURE — 99213 OFFICE O/P EST LOW 20 MIN: CPT | Performed by: SURGERY

## 2021-07-30 NOTE — PROGRESS NOTES
Subjective   Tiera Grove is a 41 y.o. female     History of Present Illness   She is a very nice 41-year-old white female who 1 year ago underwent a left modified radical mastectomy for a 3 cm grade 3 estrogen receptor positive invasive ductal cancer.  3 out of 15 lymph nodes were positive for cancer.  The patient also underwent a right total mastectomy with immediate reconstruction.  Her Oncotype recurrence score was 29 and she had placement of a MediPort and subsequent adjuvant chemotherapy.  The port has since been removed.  She underwent radiation treatment because of her positive axillary lymph nodes and as a result, has not had exchange of her expanders for implants yet.  She is also on hormone blocking therapy and seems to be tolerating that well.    The patient has not palpated any abnormalities of concern and only complains of some hot flashes related to her hormone blocking therapy.    The patient did have some left upper extremity arm swelling during radiation but since stopping that things have gotten better.      Review of Systems neurological-the patient has some neuropathy of her feet which is stable.  Musculoskeletal-no unusual bony pains.  Past Medical History:   Diagnosis Date   • Anemia    • Breast cancer (CMS/HCC) 06/2020    Left IDC   • Eczema    • History of iron deficiency anemia    • History of tension headache    • Vitamin D deficiency      Past Surgical History:   Procedure Laterality Date   • BREAST BIOPSY Left 2014    benign   • BREAST BIOPSY Left 06/2020    malignant   • BREAST RECONSTRUCTION Bilateral 8/6/2020    Procedure: BILATERAL PREPECTORIAL PLACEMEN OF TISSUE EXPANDERS AND ALLODERM;  Surgeon: Néstor Evans MD;  Location: [facility];  Service: Plastics;  Laterality: Bilateral;   • COLONOSCOPY     • EAR TUBES      SEVERAL TIMES   • ENDOSCOPY     • MASTECTOMY W/ SENTINEL NODE BIOPSY Bilateral 8/6/2020    Procedure: BREAST MASTECTOMY WITH SENTINEL NODE BIOPSY  BILATERAL WITH IMMEDIATE RECONSTRUCTION The sentinel lymph node biopsy will be performed on the left side only.  There is a possibility of an axillary dissection;  Surgeon: Dirk Fenton MD;  Location: [facility];  Service: General;  Laterality: Bilateral;   • VENOUS ACCESS DEVICE (PORT) INSERTION Right 9/9/2020    Procedure: INSERTION VENOUS ACCESS DEVICE;  Surgeon: Dirk Fenton MD;  Location: [facility] OR;  Service: General;  Laterality: Right;   • VENOUS ACCESS DEVICE (PORT) REMOVAL Right 5/10/2021    Procedure: REMOVAL VENOUS ACCESS DEVICE;  Surgeon: Dirk Fenton MD;  Location: [facility];  Service: General;  Laterality: Right;   • WISDOM TOOTH EXTRACTION       Family History   Problem Relation Age of Onset   • Hearing loss Mother    • Hyperlipidemia Mother    • Alcohol abuse Father    • Deep vein thrombosis Father    • Hyperlipidemia Father    • Hypertension Father    • Diabetes Father    • Melanoma Maternal Aunt    • Breast cancer Maternal Aunt    • Cancer Maternal Aunt         Adrenal glands   • Asthma Maternal Uncle    • Melanoma Maternal Uncle    • Colon cancer Maternal Uncle    • Breast cancer Paternal Aunt    • Heart failure Maternal Grandmother    • Colon cancer Maternal Grandmother    • Depression Maternal Grandfather    • Heart failure Maternal Grandfather    • Birth defects Nephew    • Breast cancer Maternal Cousin    • Breast cancer Maternal Cousin    • Diabetes Paternal Grandfather    • Malig Hyperthermia Neg Hx      Social History     Socioeconomic History   • Marital status:      Spouse name: Christopher   • Number of children: 2   • Years of education: College   • Highest education level: Not on file   Tobacco Use   • Smoking status: Never Smoker   • Smokeless tobacco: Never Used   Substance and Sexual Activity   • Alcohol use: Not Currently     Comment: Occasionally   • Drug use: Never   • Sexual activity: Defer       Objective   Physical  Exam  Constitutional-slightly overweight white female in no acute distress  Psychiatric-alert and oriented to person place and time.  Left chest wall-she has the expander in place which appears to be fully inflated.  The incision looks fine and the nipple areolar complex has been removed.  I do not palpate any masses beneath the skin flaps or along the incision.  Right chest wall-she has an expander in place which is fully inflated.  The nipple areolar complex is absent and the incision looks fine.  I do not palpate any masses beneath the skin flaps or along the incision.  Lymphatics-there is no cervical or axillary adenopathy  Musculoskeletal-slight decrease in mobility of the left upper extremity.  I do not see any obvious lymphedema and she has good strength in all 4 extremities.  Abdomen-soft and nontender without masses or hepatosplenomegaly.  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  The patient appears to be free of disease at this point in time.  Her repeat CBC shows a normal hemoglobin and hematocrit but the white count is slightly low at 3.2.  Review of her BMP shows normal electrolytes and a creatinine of 0.55.  She will continue to follow with the medical oncologist.  No one is really checking her chest wall and I will be the one doing that.  I plan to see her in the office in 6 months.    The encounter diagnosis was Malignant neoplasm of upper-outer quadrant of left breast in female, estrogen receptor positive (CMS/HCC).

## 2021-07-30 NOTE — TELEPHONE ENCOUNTER
Patient had another question she remembered right after she left today and would like you to call her.

## 2021-12-22 ENCOUNTER — APPOINTMENT (OUTPATIENT)
Dept: WOMENS IMAGING | Facility: HOSPITAL | Age: 41
End: 2021-12-22

## 2021-12-22 PROCEDURE — 76882 US LMTD JT/FCL EVL NVASC XTR: CPT | Performed by: RADIOLOGY

## 2022-03-11 ENCOUNTER — OFFICE VISIT (OUTPATIENT)
Dept: MAMMOGRAPHY | Facility: CLINIC | Age: 42
End: 2022-03-11

## 2022-03-11 VITALS
OXYGEN SATURATION: 99 % | RESPIRATION RATE: 16 BRPM | SYSTOLIC BLOOD PRESSURE: 114 MMHG | BODY MASS INDEX: 26.52 KG/M2 | HEIGHT: 66 IN | HEART RATE: 78 BPM | DIASTOLIC BLOOD PRESSURE: 62 MMHG | WEIGHT: 165 LBS

## 2022-03-11 DIAGNOSIS — Z17.0 MALIGNANT NEOPLASM OF UPPER-OUTER QUADRANT OF LEFT BREAST IN FEMALE, ESTROGEN RECEPTOR POSITIVE: Primary | ICD-10-CM

## 2022-03-11 DIAGNOSIS — C50.412 MALIGNANT NEOPLASM OF UPPER-OUTER QUADRANT OF LEFT BREAST IN FEMALE, ESTROGEN RECEPTOR POSITIVE: Primary | ICD-10-CM

## 2022-03-11 PROCEDURE — 99213 OFFICE O/P EST LOW 20 MIN: CPT | Performed by: SURGERY

## 2022-03-11 NOTE — PROGRESS NOTES
Subjective   Tiera Grove is a 41 y.o. female     History of Present Illness she is a nice 41-year-old white female with a diagnosis of left breast cancer approximately 1-1/2 years ago.  At that time she had 3 of 16+ lymph nodes and was treated with bilateral mastectomies and the left axillary lymph node dissection.  She did have immediate reconstruction with implants.  Because of the involved lymph nodes, she did have left chest wall irradiation and axillary irradiation.  She does remain on hormone blocking therapy.  The patient noted a possible small lump in the low left axilla in November which is tender to palpation the is around a centimeter in size according to the patient although it is somewhat ill-defined.  In December she had an ultrasound of this area performed and it was negative.  I have personally reviewed those imaging studies and agree with the findings.  The patient does not feel that there has been any change in size of the area.    The patient does have some flareups of lymphedema mainly in the axillary location but currently feels that there is no significant swelling at this point in time.  Review of Systems constitutional-no unusual changes in weight or appetite.  Past Medical History:   Diagnosis Date   • Anemia    • Breast cancer (HCC) 06/2020    Left IDC   • Eczema    • History of iron deficiency anemia    • History of tension headache    • Vitamin D deficiency      Past Surgical History:   Procedure Laterality Date   • BREAST BIOPSY Left 2014    benign   • BREAST BIOPSY Left 06/2020    malignant   • BREAST IMPLANT SURGERY  11/2021    Expanders removed, implants placed. Dr. Evans   • BREAST RECONSTRUCTION Bilateral 08/06/2020    Procedure: BILATERAL PREPECTORIAL PLACEMEN OF TISSUE EXPANDERS AND ALLODERM;  Surgeon: Néstor Evans MD;  Location: [facility];  Service: Plastics;  Laterality: Bilateral;   • COLONOSCOPY     • EAR TUBES      SEVERAL TIMES   • ENDOSCOPY     •  HYSTERECTOMY Bilateral 12/2021    Dr. Tee   • MASTECTOMY W/ SENTINEL NODE BIOPSY Bilateral 08/06/2020    Procedure: BREAST MASTECTOMY WITH SENTINEL NODE BIOPSY BILATERAL WITH IMMEDIATE RECONSTRUCTION The sentinel lymph node biopsy will be performed on the left side only.  There is a possibility of an axillary dissection;  Surgeon: Dirk Fenton MD;  Location: [facility];  Service: General;  Laterality: Bilateral;   • VENOUS ACCESS DEVICE (PORT) INSERTION Right 09/09/2020    Procedure: INSERTION VENOUS ACCESS DEVICE;  Surgeon: Dirk Fenton MD;  Location: [facility] OR;  Service: General;  Laterality: Right;   • VENOUS ACCESS DEVICE (PORT) REMOVAL Right 05/10/2021    Procedure: REMOVAL VENOUS ACCESS DEVICE;  Surgeon: Drik Fenton MD;  Location: [facility] [place];  Service: General;  Laterality: Right;   • WISDOM TOOTH EXTRACTION       Family History   Problem Relation Age of Onset   • Hearing loss Mother    • Hyperlipidemia Mother    • Alcohol abuse Father    • Deep vein thrombosis Father    • Hyperlipidemia Father    • Hypertension Father    • Diabetes Father    • Melanoma Maternal Aunt    • Breast cancer Maternal Aunt    • Cancer Maternal Aunt         Adrenal glands   • Asthma Maternal Uncle    • Melanoma Maternal Uncle    • Colon cancer Maternal Uncle    • Breast cancer Paternal Aunt    • Heart failure Maternal Grandmother    • Colon cancer Maternal Grandmother    • Depression Maternal Grandfather    • Heart failure Maternal Grandfather    • Diabetes Paternal Grandfather    • Breast cancer Maternal Cousin    • Breast cancer Maternal Cousin    • Birth defects Nephew    • Malig Hyperthermia Neg Hx    • Ovarian cancer Neg Hx      Social History     Socioeconomic History   • Marital status:      Spouse name: Christopher   • Number of children: 2   • Years of education: College   Tobacco Use   • Smoking status: Never Smoker   • Smokeless tobacco: Never Used   Vaping Use   • Vaping Use:  Never used   Substance and Sexual Activity   • Alcohol use: Not Currently     Comment: Occasionally   • Drug use: Never   • Sexual activity: Defer       Objective   Physical Exam   Constitutional-average weight white female in no acute distress  Left chest wall-status post mastectomy with removal of the nipple areolar complex.  Skin is hyperpigmented from the radiation and somewhat fibrotic.  There are no palpable masses beneath the skin flaps.  The area that the patient is feeling seems to be a combination of postsurgical changes and radiation.  I could not feel a distinct mass that concern me today.  Right chest wall-status post mastectomy with removal of the nipple areolar complex.  There are no palpable masses beneath the skin flaps or along the incision.  Lymphatics-no cervical or axillary adenopathy  Extremities-good range of motion of both upper extremities and no signs of lymphedema.  Assessment/Plan   Personal history of left breast cancer metastatic to several lymph nodes.  She continues to follow with the medical oncologist and is on hormone blocking therapy.  I do not think the area in the left axilla is concerning.  The patient will continue to monitor this and if she feels there is any change we will consider an MRI versus CT scan.  My plans are to see her back in the office in 6 months    The encounter diagnosis was Malignant neoplasm of upper-outer quadrant of left breast in female, estrogen receptor positive (HCC).

## 2022-10-26 ENCOUNTER — OFFICE VISIT (OUTPATIENT)
Dept: MAMMOGRAPHY | Facility: CLINIC | Age: 42
End: 2022-10-26

## 2022-10-26 DIAGNOSIS — Z17.0 MALIGNANT NEOPLASM OF UPPER-OUTER QUADRANT OF LEFT BREAST IN FEMALE, ESTROGEN RECEPTOR POSITIVE: Primary | ICD-10-CM

## 2022-10-26 DIAGNOSIS — C50.412 MALIGNANT NEOPLASM OF UPPER-OUTER QUADRANT OF LEFT BREAST IN FEMALE, ESTROGEN RECEPTOR POSITIVE: Primary | ICD-10-CM

## 2022-10-26 PROCEDURE — 99213 OFFICE O/P EST LOW 20 MIN: CPT | Performed by: SURGERY

## 2022-10-26 NOTE — PROGRESS NOTES
Subjective   Tiera Grove is a 42 y.o. female     History of Present Illness she is a very nice 42-year-old white female who is now 2 years out from treatment of left breast cancer.  At the time she had an invasive cancer measuring 3 cm with associated DCIS extending over a 5 cm area.  There were 3 positive lymph nodes the patient underwent an axillary dissection.  She also received radiation treatment to the chest wall on the left side.  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  The patient also occasionally gets some swelling of the left upper extremity near the axilla.  She seems to control it well with a lymphedema pump.  She remains on hormone blocking therapy and appears to be tolerating that well.        Review of Systems neurological- she does have some neuropathy of her feet.      Musculoskeletal- no unusual bony pains      Past Medical History:   Diagnosis Date   • Anemia    • Breast cancer (HCC) 06/2020    Left IDC   • Eczema    • History of iron deficiency anemia    • History of tension headache    • Vitamin D deficiency      Past Surgical History:   Procedure Laterality Date   • BREAST BIOPSY Left 2014    benign   • BREAST BIOPSY Left 06/2020    malignant   • BREAST IMPLANT SURGERY  11/2021    Expanders removed, implants placed. Dr. Evans   • BREAST RECONSTRUCTION Bilateral 08/06/2020    Procedure: BILATERAL PREPECTORIAL PLACEMEN OF TISSUE EXPANDERS AND ALLODERM;  Surgeon: Néstor Evans MD;  Location: [facility];  Service: Plastics;  Laterality: Bilateral;   • COLONOSCOPY     • EAR TUBES      SEVERAL TIMES   • ENDOSCOPY     • HYSTERECTOMY Bilateral 12/2021    Dr. Tee   • MASTECTOMY W/ SENTINEL NODE BIOPSY Bilateral 08/06/2020    Procedure: BREAST MASTECTOMY WITH SENTINEL NODE BIOPSY BILATERAL WITH IMMEDIATE RECONSTRUCTION The sentinel lymph node biopsy will be performed on the left side only.  There is a possibility of an axillary  dissection;  Surgeon: Dirk Fenton MD;  Location: [facility] OR;  Service: General;  Laterality: Bilateral;   • VENOUS ACCESS DEVICE (PORT) INSERTION Right 09/09/2020    Procedure: INSERTION VENOUS ACCESS DEVICE;  Surgeon: Dirk Fenton MD;  Location: [facility] OR;  Service: General;  Laterality: Right;   • VENOUS ACCESS DEVICE (PORT) REMOVAL Right 05/10/2021    Procedure: REMOVAL VENOUS ACCESS DEVICE;  Surgeon: Dirk Fenton MD;  Location: [facility];  Service: General;  Laterality: Right;   • WISDOM TOOTH EXTRACTION       Family History   Problem Relation Age of Onset   • Hearing loss Mother    • Hyperlipidemia Mother    • Alcohol abuse Father    • Deep vein thrombosis Father    • Hyperlipidemia Father    • Hypertension Father    • Diabetes Father    • Melanoma Maternal Aunt    • Breast cancer Maternal Aunt    • Cancer Maternal Aunt         Adrenal glands   • Asthma Maternal Uncle    • Melanoma Maternal Uncle    • Colon cancer Maternal Uncle    • Breast cancer Paternal Aunt    • Heart failure Maternal Grandmother    • Colon cancer Maternal Grandmother    • Depression Maternal Grandfather    • Heart failure Maternal Grandfather    • Diabetes Paternal Grandfather    • Breast cancer Maternal Cousin    • Breast cancer Maternal Cousin    • Birth defects Nephew    • Malig Hyperthermia Neg Hx    • Ovarian cancer Neg Hx      Social History     Socioeconomic History   • Marital status:      Spouse name: Christopher   • Number of children: 2   • Years of education: College   Tobacco Use   • Smoking status: Never   • Smokeless tobacco: Never   Vaping Use   • Vaping Use: Never used   Substance and Sexual Activity   • Alcohol use: Not Currently     Comment: Occasionally   • Drug use: Never   • Sexual activity: Defer       Objective   Physical Exam   Constitutional-BMI 26.6, no acute distress  Chest wall-status post bilateral mastectomies.  On the left side there is hyperpigmentation to the  skin related to the radiation.  The incision is well-healed and may be a little ropey.  The implant appears to be intact.  The right side also has a somewhat ropey incision with an intact implant.  There are no palpable masses beneath the skin flaps.  She has not had nipple areolar reconstruction on either side.  Lymphatics- no cervical or axillary adenopathy  Extremities-good range of motion of both upper extremities.  I do not see any obvious lymphedema today.    Assessment & Plan   Left breast cancer s/p bilateral mastectomies and left chest wall radiation.  The patient also received chemotherapy and is currently on tamoxifen.  She is not undergoing any imaging studies but her blood work appears to be in good order and her physical exam does not reveal any concerning findings.  I would like to see her back in the office in 1 year.    The encounter diagnosis was Malignant neoplasm of upper-outer quadrant of left breast in female, estrogen receptor positive (HCC).

## 2023-07-11 ENCOUNTER — APPOINTMENT (OUTPATIENT)
Dept: WOMENS IMAGING | Facility: HOSPITAL | Age: 43
End: 2023-07-11
Payer: COMMERCIAL

## 2023-07-11 PROCEDURE — 76642 ULTRASOUND BREAST LIMITED: CPT | Performed by: RADIOLOGY

## 2023-10-25 ENCOUNTER — APPOINTMENT (OUTPATIENT)
Dept: WOMENS IMAGING | Facility: HOSPITAL | Age: 43
End: 2023-10-25
Payer: COMMERCIAL

## 2023-10-25 ENCOUNTER — OFFICE VISIT (OUTPATIENT)
Dept: MAMMOGRAPHY | Facility: CLINIC | Age: 43
End: 2023-10-25
Payer: COMMERCIAL

## 2023-10-25 VITALS
BODY MASS INDEX: 25.07 KG/M2 | SYSTOLIC BLOOD PRESSURE: 136 MMHG | DIASTOLIC BLOOD PRESSURE: 91 MMHG | HEIGHT: 66 IN | HEART RATE: 80 BPM | OXYGEN SATURATION: 99 % | WEIGHT: 156 LBS

## 2023-10-25 DIAGNOSIS — Z17.0 MALIGNANT NEOPLASM OF UPPER-OUTER QUADRANT OF LEFT BREAST IN FEMALE, ESTROGEN RECEPTOR POSITIVE: Primary | ICD-10-CM

## 2023-10-25 DIAGNOSIS — C50.412 MALIGNANT NEOPLASM OF UPPER-OUTER QUADRANT OF LEFT BREAST IN FEMALE, ESTROGEN RECEPTOR POSITIVE: Primary | ICD-10-CM

## 2023-10-25 PROCEDURE — 76642 ULTRASOUND BREAST LIMITED: CPT | Performed by: RADIOLOGY

## 2023-10-25 PROCEDURE — 99212 OFFICE O/P EST SF 10 MIN: CPT | Performed by: SURGERY

## 2023-10-25 NOTE — LETTER
October 25, 2023     JOHANNA Mcgill  [address]  [address]  [facility] [address]    Patient: Tiera Grove   YOB: 1980   Date of Visit: 10/25/2023     Dear JOHANNA Mcgill:       Thank you for referring Tiera Grove to me for evaluation. Below are the relevant portions of my assessment and plan of care.    If you have questions, please do not hesitate to call me. I look forward to following Tiera along with you.         Sincerely,        Dirk Fenton MD        CC: MD Eliceo Greenberg, Dirk YUEN MD  10/25/23 1531  Sign when Signing Visit  Subjective   Tiera Grove is a 43 y.o. female     History of Present Illness   She is a nice 43-year-old white female well-known to me.  She is 3 years out from treatment of left breast cancer.  On the left side she had a modified radical mastectomy because of 3 lymph nodes that were involved with cancer.  She did receive radiation and also chemotherapy.    There was some firmness noted beneath the scar on the right-hand side and the patient had an ultrasound 3 months ago consistent with scar tissue.  A 3-month follow-up was recommended and she just had that today.  The report is not available yet.    Aside from this area patient, the patient has not noted any other areas of concern.    The patient is on tamoxifen and appears to be tolerating it well.    Review of Systems constitutional-no unusual changes in weight or appetite.  Past Medical History:   Diagnosis Date   • Anemia    • Breast cancer 06/2020    Left IDC   • Eczema    • History of iron deficiency anemia    • History of tension headache    • Vitamin D deficiency      Past Surgical History:   Procedure Laterality Date   • BREAST BIOPSY Left 2014    benign   • BREAST BIOPSY Left 06/2020    malignant   • BREAST IMPLANT SURGERY  11/2021    Expanders removed, implants placed. Dr. Evans   • BREAST RECONSTRUCTION Bilateral 08/06/2020    Procedure: BILATERAL PREPECTORIAL  PLACEMEN OF TISSUE EXPANDERS AND ALLODERM;  Surgeon: Néstor Evans MD;  Location: [facility] OR;  Service: Plastics;  Laterality: Bilateral;   • COLONOSCOPY     • EAR TUBES      SEVERAL TIMES   • ENDOSCOPY     • HYSTERECTOMY Bilateral 12/2021    Dr. Tee   • MASTECTOMY W/ SENTINEL NODE BIOPSY Bilateral 08/06/2020    Procedure: BREAST MASTECTOMY WITH SENTINEL NODE BIOPSY BILATERAL WITH IMMEDIATE RECONSTRUCTION The sentinel lymph node biopsy will be performed on the left side only.  There is a possibility of an axillary dissection;  Surgeon: Dirk Fenton MD;  Location: [facility] OR;  Service: General;  Laterality: Bilateral;   • VENOUS ACCESS DEVICE (PORT) INSERTION Right 09/09/2020    Procedure: INSERTION VENOUS ACCESS DEVICE;  Surgeon: Dirk Fenton MD;  Location: [facility] OR;  Service: General;  Laterality: Right;   • VENOUS ACCESS DEVICE (PORT) REMOVAL Right 05/10/2021    Procedure: REMOVAL VENOUS ACCESS DEVICE;  Surgeon: Dirk Fenton MD;  [facility]: [facility];  Service: General;  Laterality: Right;   • WISDOM TOOTH EXTRACTION       Family History   Problem Relation Age of Onset   • Hearing loss Mother    • Hyperlipidemia Mother    • Alcohol abuse Father    • Deep vein thrombosis Father    • Hyperlipidemia Father    • Hypertension Father    • Diabetes Father    • Melanoma Maternal Aunt    • Breast cancer Maternal Aunt    • Cancer Maternal Aunt         Adrenal glands   • Asthma Maternal Uncle    • Melanoma Maternal Uncle    • Colon cancer Maternal Uncle    • Breast cancer Paternal Aunt    • Heart failure Maternal Grandmother    • Colon cancer Maternal Grandmother    • Depression Maternal Grandfather    • Heart failure Maternal Grandfather    • Diabetes Paternal Grandfather    • Breast cancer Maternal Cousin    • Breast cancer Maternal Cousin    • Birth defects Nephew    • Malig Hyperthermia Neg Hx    • Ovarian cancer Neg Hx      Social History     Socioeconomic History    • Marital status:      Spouse name: Christopher   • Number of children: 2   • Years of education: College   Tobacco Use   • Smoking status: Never   • Smokeless tobacco: Never   Vaping Use   • Vaping Use: Never used   Substance and Sexual Activity   • Alcohol use: Not Currently     Comment: Occasionally   • Drug use: Never   • Sexual activity: Defer       Objective   Physical Exam  Constitutional-BMI 25.2, no acute distress  Reconstructed right breast-the implant is intact.  The scar is well-healed but slightly wide.  Immediately beneath that there is some ill-defined slightly firm tissue.  I do not feel any other masses beneath the skin flaps.  Reconstructed left breast-she has an intact implant.  The scar is similar and that it is wide and has a little bit of firmness beneath it as well.  There are no masses beneath the skin flaps.  Lymphatics-no cervical or axillary adenopathy.  Abdomen-soft and nontender without masses or hepatosplenomegaly.    Assessment & Plan   Personal history of breast cancer status post bilateral mastectomies-as a result of her surgery she is not having any regular imaging although an ultrasound was performed on the right side for some palpable abnormalities beneath the incision.  I have been feeling some firmness beneath the incision which I think is mostly scar related.  I will be on the look out for her ultrasound but I think it should be just fine.  I will see her back in the office in 1 year.  She will continue on with tamoxifen.    There were no encounter diagnoses.

## 2023-10-25 NOTE — PROGRESS NOTES
Subjective   Tiera Grove is a 43 y.o. female     History of Present Illness   She is a nice 43-year-old white female well-known to me.  She is 3 years out from treatment of left breast cancer.  On the left side she had a modified radical mastectomy because of 3 lymph nodes that were involved with cancer.  She did receive radiation and also chemotherapy.    There was some firmness noted beneath the scar on the right-hand side and the patient had an ultrasound 3 months ago consistent with scar tissue.  A 3-month follow-up was recommended and she just had that today.  The report is not available yet.    Aside from this area patient, the patient has not noted any other areas of concern.    The patient is on tamoxifen and appears to be tolerating it well.    Review of Systems constitutional-no unusual changes in weight or appetite.  Past Medical History:   Diagnosis Date    Anemia     Breast cancer 06/2020    Left IDC    Eczema     History of iron deficiency anemia     History of tension headache     Vitamin D deficiency      Past Surgical History:   Procedure Laterality Date    BREAST BIOPSY Left 2014    benign    BREAST BIOPSY Left 06/2020    malignant    BREAST IMPLANT SURGERY  11/2021    Expanders removed, implants placed. Dr. Evans    BREAST RECONSTRUCTION Bilateral 08/06/2020    Procedure: BILATERAL PREPECTORIAL PLACEMEN OF TISSUE EXPANDERS AND ALLODERM;  Surgeon: Néstor Evans MD;  Location: [facility]  Service: Plastics;  Laterality: Bilateral;    COLONOSCOPY      EAR TUBES      SEVERAL TIMES    ENDOSCOPY      HYSTERECTOMY Bilateral 12/2021    Dr. Tee    MASTECTOMY W/ SENTINEL NODE BIOPSY Bilateral 08/06/2020    Procedure: BREAST MASTECTOMY WITH SENTINEL NODE BIOPSY BILATERAL WITH IMMEDIATE RECONSTRUCTION The sentinel lymph node biopsy will be performed on the left side only.  There is a possibility of an axillary dissection;  Surgeon: Dirk Fenton MD;  Location: [facility]   Service: General;  Laterality: Bilateral;    VENOUS ACCESS DEVICE (PORT) INSERTION Right 09/09/2020    Procedure: INSERTION VENOUS ACCESS DEVICE;  Surgeon: Dirk Fenton MD;  Location: [facility] MAIN OR;  Service: General;  Laterality: Right;    VENOUS ACCESS DEVICE (PORT) REMOVAL Right 05/10/2021    Procedure: REMOVAL VENOUS ACCESS DEVICE;  Surgeon: Dirk Fenton MD;  Location: [facility] OR [facility];  Service: General;  Laterality: Right;    WISDOM TOOTH EXTRACTION       Family History   Problem Relation Age of Onset    Hearing loss Mother     Hyperlipidemia Mother     Alcohol abuse Father     Deep vein thrombosis Father     Hyperlipidemia Father     Hypertension Father     Diabetes Father     Melanoma Maternal Aunt     Breast cancer Maternal Aunt     Cancer Maternal Aunt         Adrenal glands    Asthma Maternal Uncle     Melanoma Maternal Uncle     Colon cancer Maternal Uncle     Breast cancer Paternal Aunt     Heart failure Maternal Grandmother     Colon cancer Maternal Grandmother     Depression Maternal Grandfather     Heart failure Maternal Grandfather     Diabetes Paternal Grandfather     Breast cancer Maternal Cousin     Breast cancer Maternal Cousin     Birth defects Nephew     Malig Hyperthermia Neg Hx     Ovarian cancer Neg Hx      Social History     Socioeconomic History    Marital status:      Spouse name: Christopher    Number of children: 2    Years of education: College   Tobacco Use    Smoking status: Never    Smokeless tobacco: Never   Vaping Use    Vaping Use: Never used   Substance and Sexual Activity    Alcohol use: Not Currently     Comment: Occasionally    Drug use: Never    Sexual activity: Defer       Objective   Physical Exam  Constitutional-BMI 25.2, no acute distress  Reconstructed right breast-the implant is intact.  The scar is well-healed but slightly wide.  Immediately beneath that there is some ill-defined slightly firm tissue.  I do not feel any other masses beneath the  skin flaps.  Reconstructed left breast-she has an intact implant.  The scar is similar and that it is wide and has a little bit of firmness beneath it as well.  There are no masses beneath the skin flaps.  Lymphatics-no cervical or axillary adenopathy.  Abdomen-soft and nontender without masses or hepatosplenomegaly.    Assessment & Plan   Personal history of breast cancer status post bilateral mastectomies-as a result of her surgery she is not having any regular imaging although an ultrasound was performed on the right side for some palpable abnormalities beneath the incision.  I have been feeling some firmness beneath the incision which I think is mostly scar related.  I will be on the look out for her ultrasound but I think it should be just fine.  I will see her back in the office in 1 year.  She will continue on with tamoxifen.    There were no encounter diagnoses.

## 2024-04-29 ENCOUNTER — APPOINTMENT (OUTPATIENT)
Dept: WOMENS IMAGING | Facility: HOSPITAL | Age: 44
End: 2024-04-29
Payer: COMMERCIAL

## 2024-04-29 PROCEDURE — 76642 ULTRASOUND BREAST LIMITED: CPT | Performed by: RADIOLOGY

## 2024-10-23 ENCOUNTER — APPOINTMENT (OUTPATIENT)
Dept: WOMENS IMAGING | Facility: HOSPITAL | Age: 44
End: 2024-10-23
Payer: COMMERCIAL

## 2024-10-23 ENCOUNTER — OFFICE VISIT (OUTPATIENT)
Dept: MAMMOGRAPHY | Facility: CLINIC | Age: 44
End: 2024-10-23
Payer: COMMERCIAL

## 2024-10-23 VITALS
BODY MASS INDEX: 26.36 KG/M2 | OXYGEN SATURATION: 98 % | SYSTOLIC BLOOD PRESSURE: 127 MMHG | DIASTOLIC BLOOD PRESSURE: 75 MMHG | WEIGHT: 164 LBS | HEIGHT: 66 IN | HEART RATE: 73 BPM

## 2024-10-23 DIAGNOSIS — Z17.0 MALIGNANT NEOPLASM OF UPPER-OUTER QUADRANT OF LEFT BREAST IN FEMALE, ESTROGEN RECEPTOR POSITIVE: Primary | ICD-10-CM

## 2024-10-23 DIAGNOSIS — C50.412 MALIGNANT NEOPLASM OF UPPER-OUTER QUADRANT OF LEFT BREAST IN FEMALE, ESTROGEN RECEPTOR POSITIVE: Primary | ICD-10-CM

## 2024-10-23 PROCEDURE — 99213 OFFICE O/P EST LOW 20 MIN: CPT | Performed by: SURGERY

## 2024-10-23 PROCEDURE — 76642 ULTRASOUND BREAST LIMITED: CPT | Performed by: RADIOLOGY

## 2024-10-23 NOTE — LETTER
October 23, 2024     Steven Frances MD  [address] [facility] [address]    Patient: Tiera Grove   YOB: 1980   Date of Visit: 10/23/2024     Dear Steven Frances MD:       Thank you for referring Tiera Grove to me for evaluation. Below are the relevant portions of my assessment and plan of care.    If you have questions, please do not hesitate to call me. I look forward to following Tiera along with you.         Sincerely,        Dirk Fenton MD        CC: JOHANNA Hale DO Hoagland, William P, MD  10/23/24 1651  Sign when Signing Visit  Subjective  Tiera Grove is a 44 y.o. female     History of Present Illness   She is a nice 44-year-old white female previously diagnosed with left breast cancer in August 2020.  She ended up having a modified radical mastectomy on the left side because she had 3 lymph nodes involved with cancer at the time of surgery.  The patient also received radiation and chemotherapy.  The patient also underwent a prophylactic right mastectomy.    The patient has noted some scar tissue beneath the middle portion of the incision on the right-hand side.  An ultrasound 3 months ago felt that that was likely the case.  I have reviewed the imaging and it was felt that on the right side she had to oval hypoechoic masses measuring 9 and 7 mm respectively.  These were consistent with postsurgical change.  On the left side there was also what seemed to be an intramammary lymph node measuring 7 mm with no change from prior exams.  A 6-month follow-up ultrasound was recommended.  That was performed today and of course we do not have those reports.    The patient does not feel anything palpable that concerns her.      Review of Systems constitutional-the patient has noted a little bit of dizziness recently but it seems to be improving.  Past Medical History:   Diagnosis Date   • Anemia    • Breast cancer 06/2020    Left IDC   • Eczema     • History of iron deficiency anemia    • History of tension headache    • Vitamin D deficiency      Past Surgical History:   Procedure Laterality Date   • BREAST BIOPSY Left 2014    benign   • BREAST BIOPSY Left 06/2020    malignant   • BREAST IMPLANT SURGERY  11/2021    Expanders removed, implants placed. Dr. Evans   • BREAST RECONSTRUCTION Bilateral 08/06/2020    Procedure: BILATERAL PREPECTORIAL PLACEMEN OF TISSUE EXPANDERS AND ALLODERM;  Surgeon: Néstor Evans MD;  Location: [facility];  Service: Plastics;  Laterality: Bilateral;   • COLONOSCOPY     • EAR TUBES      SEVERAL TIMES   • ENDOSCOPY     • HYSTERECTOMY Bilateral 12/2021    Dr. Tee   • MASTECTOMY W/ SENTINEL NODE BIOPSY Bilateral 08/06/2020    Procedure: BREAST MASTECTOMY WITH SENTINEL NODE BIOPSY BILATERAL WITH IMMEDIATE RECONSTRUCTION The sentinel lymph node biopsy will be performed on the left side only.  There is a possibility of an axillary dissection;  Surgeon: Dirk Fenton MD;  Location: [facility];  Service: General;  Laterality: Bilateral;   • VENOUS ACCESS DEVICE (PORT) INSERTION Right 09/09/2020    Procedure: INSERTION VENOUS ACCESS DEVICE;  Surgeon: Dirk Fenton MD;  Location: [facility];  Service: General;  Laterality: Right;   • VENOUS ACCESS DEVICE (PORT) REMOVAL Right 05/10/2021    Procedure: REMOVAL VENOUS ACCESS DEVICE;  Surgeon: Dirk Fenton MD;  Location: [facility];  Service: General;  Laterality: Right;   • WISDOM TOOTH EXTRACTION       Family History   Problem Relation Age of Onset   • Hearing loss Mother    • Hyperlipidemia Mother    • Alcohol abuse Father    • Deep vein thrombosis Father    • Hyperlipidemia Father    • Hypertension Father    • Diabetes Father    • Melanoma Maternal Aunt    • Breast cancer Maternal Aunt    • Cancer Maternal Aunt         Adrenal glands   • Asthma Maternal Uncle    • Melanoma Maternal Uncle    • Colon cancer Maternal Uncle    • Breast  cancer Paternal Aunt    • Heart failure Maternal Grandmother    • Colon cancer Maternal Grandmother    • Depression Maternal Grandfather    • Heart failure Maternal Grandfather    • Diabetes Paternal Grandfather    • Breast cancer Maternal Cousin    • Breast cancer Maternal Cousin    • Birth defects Nephew    • Malig Hyperthermia Neg Hx    • Ovarian cancer Neg Hx      Social History     Socioeconomic History   • Marital status:      Spouse name: Christopher   • Number of children: 2   • Years of education: College   Tobacco Use   • Smoking status: Never   • Smokeless tobacco: Never   Vaping Use   • Vaping status: Never Used   Substance and Sexual Activity   • Alcohol use: Not Currently     Comment: Occasionally   • Drug use: Never   • Sexual activity: Defer       Objective  Physical Exam  Constitutional-BMI 26.5, no acute distress  Reconstructed right breast-the patient did not have nipple areolar reconstruction.  The incisions are well-healed.  The implant appears to be intact.  There does appear to be some scarring beneath the central portion of the incision but it feels unchanged.  There are otherwise no masses palpable beneath the skin flaps.  Reconstructed left breast-again she did not have any nipple areolar reconstruction.  The implant appears to be intact.  The incisions are well-healed.  There is a similar pattern of density beneath the midportion of the incision similar to the other side.  There are no worrisome masses beneath the skin flaps noted.    Lymphatics-no axillary adenopathy was noted.      Assessment & Plan  Personal history of left breast cancer status post modified radical mastectomy and prophylactic right breast mastectomy with immediate reconstruction using implants-the patient appears to be doing quite well.  She continues to follow with her oncologist and is on tamoxifen.  We talked about whether she needed to be followed and here much longer.  It would probably be good to get the  results of her most recent study back.  If everything looks stable and is cleared we could probably just see her on an as-needed basis knowing that she will be examined by her medical oncologist and other doctors.    The encounter diagnosis was Malignant neoplasm of upper-outer quadrant of left breast in female, estrogen receptor positive.

## 2024-10-23 NOTE — PROGRESS NOTES
Subjective   Tiera Grove is a 44 y.o. female     History of Present Illness   She is a nice 44-year-old white female previously diagnosed with left breast cancer in August 2020.  She ended up having a modified radical mastectomy on the left side because she had 3 lymph nodes involved with cancer at the time of surgery.  The patient also received radiation and chemotherapy.  The patient also underwent a prophylactic right mastectomy.    The patient has noted some scar tissue beneath the middle portion of the incision on the right-hand side.  An ultrasound 3 months ago felt that that was likely the case.  I have reviewed the imaging and it was felt that on the right side she had to oval hypoechoic masses measuring 9 and 7 mm respectively.  These were consistent with postsurgical change.  On the left side there was also what seemed to be an intramammary lymph node measuring 7 mm with no change from prior exams.  A 6-month follow-up ultrasound was recommended.  That was performed today and of course we do not have those reports.    The patient does not feel anything palpable that concerns her.      Review of Systems constitutional-the patient has noted a little bit of dizziness recently but it seems to be improving.  Past Medical History:   Diagnosis Date    Anemia     Breast cancer 06/2020    Left IDC    Eczema     History of iron deficiency anemia     History of tension headache     Vitamin D deficiency      Past Surgical History:   Procedure Laterality Date    BREAST BIOPSY Left 2014    benign    BREAST BIOPSY Left 06/2020    malignant    BREAST IMPLANT SURGERY  11/2021    Expanders removed, implants placed. Dr. Evans    BREAST RECONSTRUCTION Bilateral 08/06/2020    Procedure: BILATERAL PREPECTORIAL PLACEMEN OF TISSUE EXPANDERS AND ALLODERM;  Surgeon: Néstor Evans MD;  Location: [facility];  Service: Plastics;  Laterality: Bilateral;    COLONOSCOPY      EAR TUBES      SEVERAL TIMES    ENDOSCOPY       HYSTERECTOMY Bilateral 12/2021    Dr. Tee    MASTECTOMY W/ SENTINEL NODE BIOPSY Bilateral 08/06/2020    Procedure: BREAST MASTECTOMY WITH SENTINEL NODE BIOPSY BILATERAL WITH IMMEDIATE RECONSTRUCTION The sentinel lymph node biopsy will be performed on the left side only.  There is a possibility of an axillary dissection;  Surgeon: Dirk Fenton MD;  Location: [facility] MAIN OR;  Service: General;  Laterality: Bilateral;    VENOUS ACCESS DEVICE (PORT) INSERTION Right 09/09/2020    Procedure: INSERTION VENOUS ACCESS DEVICE;  Surgeon: Dirk Fenton MD;  Location: [facility] MAIN OR;  Service: General;  Laterality: Right;    VENOUS ACCESS DEVICE (PORT) REMOVAL Right 05/10/2021    Procedure: REMOVAL VENOUS ACCESS DEVICE;  Surgeon: Dirk Fenton MD;  Location: [facility] OR [facility];  Service: General;  Laterality: Right;    WISDOM TOOTH EXTRACTION       Family History   Problem Relation Age of Onset    Hearing loss Mother     Hyperlipidemia Mother     Alcohol abuse Father     Deep vein thrombosis Father     Hyperlipidemia Father     Hypertension Father     Diabetes Father     Melanoma Maternal Aunt     Breast cancer Maternal Aunt     Cancer Maternal Aunt         Adrenal glands    Asthma Maternal Uncle     Melanoma Maternal Uncle     Colon cancer Maternal Uncle     Breast cancer Paternal Aunt     Heart failure Maternal Grandmother     Colon cancer Maternal Grandmother     Depression Maternal Grandfather     Heart failure Maternal Grandfather     Diabetes Paternal Grandfather     Breast cancer Maternal Cousin     Breast cancer Maternal Cousin     Birth defects Nephew     Malig Hyperthermia Neg Hx     Ovarian cancer Neg Hx      Social History     Socioeconomic History    Marital status:      Spouse name: Christopher    Number of children: 2    Years of education: College   Tobacco Use    Smoking status: Never    Smokeless tobacco: Never   Vaping Use    Vaping status: Never Used   Substance and Sexual  Activity    Alcohol use: Not Currently     Comment: Occasionally    Drug use: Never    Sexual activity: Defer       Objective   Physical Exam  Constitutional-BMI 26.5, no acute distress  Reconstructed right breast-the patient did not have nipple areolar reconstruction.  The incisions are well-healed.  The implant appears to be intact.  There does appear to be some scarring beneath the central portion of the incision but it feels unchanged.  There are otherwise no masses palpable beneath the skin flaps.  Reconstructed left breast-again she did not have any nipple areolar reconstruction.  The implant appears to be intact.  The incisions are well-healed.  There is a similar pattern of density beneath the midportion of the incision similar to the other side.  There are no worrisome masses beneath the skin flaps noted.    Lymphatics-no axillary adenopathy was noted.      Assessment & Plan   Personal history of left breast cancer status post modified radical mastectomy and prophylactic right breast mastectomy with immediate reconstruction using implants-the patient appears to be doing quite well.  She continues to follow with her oncologist and is on tamoxifen.  We talked about whether she needed to be followed and here much longer.  It would probably be good to get the results of her most recent study back.  If everything looks stable and is cleared we could probably just see her on an as-needed basis knowing that she will be examined by her medical oncologist and other doctors.    The encounter diagnosis was Malignant neoplasm of upper-outer quadrant of left breast in female, estrogen receptor positive.

## 2025-02-12 ENCOUNTER — TELEPHONE (OUTPATIENT)
Dept: MAMMOGRAPHY | Facility: CLINIC | Age: 45
End: 2025-02-12
Payer: COMMERCIAL

## 2025-04-30 ENCOUNTER — APPOINTMENT (OUTPATIENT)
Dept: WOMENS IMAGING | Facility: HOSPITAL | Age: 45
End: 2025-04-30
Payer: COMMERCIAL

## 2025-04-30 PROCEDURE — 76642 ULTRASOUND BREAST LIMITED: CPT | Performed by: RADIOLOGY

## 2025-05-02 ENCOUNTER — RESULTS FOLLOW-UP (OUTPATIENT)
Dept: SURGERY | Facility: CLINIC | Age: 45
End: 2025-05-02
Payer: COMMERCIAL

## 2025-05-02 NOTE — TELEPHONE ENCOUNTER
----- Message from Romulo Zuñiga sent at 5/2/2025 10:39 AM EDT -----  Mammogram I will see her in October  ----- Message -----  From: Interface, Scans Incoming  Sent: 5/2/2025  10:30 AM EDT  To: JOHANNA Parsons

## 2025-05-02 NOTE — TELEPHONE ENCOUNTER
Spoke to pt and let her know that her imaging is normal and we will see her at her set appt in oct   Pt stated understanding

## (undated) DEVICE — Device

## (undated) DEVICE — 3M™ STERI-STRIP™ REINFORCED ADHESIVE SKIN CLOSURES, R1547, 1/2 IN X 4 IN (12 MM X 100 MM), 6 STRIPS/ENVELOPE: Brand: 3M™ STERI-STRIP™

## (undated) DEVICE — DRAPE,REIN 53X77,STERILE: Brand: MEDLINE

## (undated) DEVICE — SPNG GZ WOVN 4X4IN 12PLY 10/BX STRL

## (undated) DEVICE — ANTIBACTERIAL UNDYED BRAIDED (POLYGLACTIN 910), SYNTHETIC ABSORBABLE SUTURE: Brand: COATED VICRYL

## (undated) DEVICE — INTENDED FOR TISSUE SEPARATION, AND OTHER PROCEDURES THAT REQUIRE A SHARP SURGICAL BLADE TO PUNCTURE OR CUT.: Brand: BARD-PARKER ® CARBON RIB-BACK BLADES

## (undated) DEVICE — SUT PROLN 2/0 CT2 30IN 8411H

## (undated) DEVICE — CVR TRANSD CIV FLX TPR 11.9 TO 3.8X61CM

## (undated) DEVICE — SUT SILK 2/0 FS BLK 18IN 685G

## (undated) DEVICE — STPCK 3WY D201 DISCOFIX

## (undated) DEVICE — NDL HYPO PRECISIONGLIDE REG 25G 1 1/2

## (undated) DEVICE — COVER,LIGHT HANDLE,FLX,2/PK: Brand: MEDLINE INDUSTRIES, INC.

## (undated) DEVICE — LOU MINOR PROCEDURE: Brand: MEDLINE INDUSTRIES, INC.

## (undated) DEVICE — GLV SURG BIOGEL LTX PF 6 1/2

## (undated) DEVICE — TOTAL TRAY, 16FR 10ML SIL FOLEY, URN: Brand: MEDLINE

## (undated) DEVICE — ELECTRD BLD EZ CLN MOD XLNG 2.75IN

## (undated) DEVICE — SUT ETHLN 3/0 PS1 18IN 1663H

## (undated) DEVICE — MEDI-VAC YANKAUER SUCTION HANDLE W/BULBOUS TIP: Brand: CARDINAL HEALTH

## (undated) DEVICE — ELECTRD BLD EXT EDGE/INSUL 6IN

## (undated) DEVICE — 3M™ STERI-STRIP™ COMPOUND BENZOIN TINCTURE 40 BAGS/CARTON 4 CARTONS/CASE C1544: Brand: 3M™ STERI-STRIP™

## (undated) DEVICE — TOWEL,OR,DSP,ST,BLUE,STD,4/PK,20PK/CS: Brand: MEDLINE

## (undated) DEVICE — GLV SURG BIOGEL LTX PF 8 1/2

## (undated) DEVICE — PK PROC MINOR TOWER 40

## (undated) DEVICE — SYR LL TP 10ML STRL

## (undated) DEVICE — ELECTRD BLD EDGE/INSUL1P 2.4X5.1MM STRL

## (undated) DEVICE — COVER,MAYO STAND,STERILE: Brand: MEDLINE

## (undated) DEVICE — SUT VIC 2/0 SH 27IN

## (undated) DEVICE — NDL HYPO ECLPS SFTY 22G 1 1/2IN

## (undated) DEVICE — SUT VIC 3/0 SH 27IN J416H

## (undated) DEVICE — BNDG ELAS ELITE V/CLOSE 6IN 5YD LF STRL

## (undated) DEVICE — NEEDLE, QUINCKE 22GX3.5": Brand: MEDLINE INDUSTRIES, INC.

## (undated) DEVICE — DRSNG SURESITE WNDW 4X4.5

## (undated) DEVICE — BANDAGE,GAUZE,BULKEE II,4.5"X4.1YD,STRL: Brand: MEDLINE

## (undated) DEVICE — APPL CHLORAPREP HI/LITE 26ML ORNG

## (undated) DEVICE — GLV SURG PREMIERPRO ORTHO LTX PF SZ8 BRN

## (undated) DEVICE — SUT PDS 2/0 SH 27IN Z317H

## (undated) DEVICE — STPLR SKIN VISISTAT WD 35CT

## (undated) DEVICE — SYR LUERLOK 5CC

## (undated) DEVICE — DRP C/ARM 41X74IN

## (undated) DEVICE — SOL NACL 0.9PCT 100ML SGL

## (undated) DEVICE — TRAP FLD MINIVAC MEGADYNE 100ML

## (undated) DEVICE — PROXIMATE RH ROTATING HEAD SKIN STAPLERS (35 WIDE) CONTAINS 35 STAINLESS STEEL STAPLES: Brand: PROXIMATE

## (undated) DEVICE — PK UNIV COMPL 40

## (undated) DEVICE — SPNG LAP 18X18IN LF STRL PK/5

## (undated) DEVICE — CONTAINER,SPECIMEN,OR STERILE,4OZ: Brand: MEDLINE

## (undated) DEVICE — PENCL E/S ULTRAVAC TELESCP NOSE HOLSTR 10FT

## (undated) DEVICE — SCANLAN® SUTURE BOOT™ INSTRUMENT JAW COVERS - ORIGINAL YELLOW, STANDARD PKG (5 PAIR/CARTRIDGE, 1 CARTRIDGE/PKG): Brand: SCANLAN® SUTURE BOOT™ INSTRUMENT JAW COVERS

## (undated) DEVICE — CONTN STRL 32OZ

## (undated) DEVICE — SUT MNCRYL 3/0 PS2 18IN MCP497G

## (undated) DEVICE — 3M™ IOBAN™ 2 ANTIMICROBIAL INCISE DRAPE 6648EZ: Brand: IOBAN™ 2

## (undated) DEVICE — SKIN PREP TRAY W/CHG: Brand: MEDLINE INDUSTRIES, INC.

## (undated) DEVICE — ADHS SKIN DERMABOND TOP ADVANCED

## (undated) DEVICE — JACKSON-PRATT 100CC BULB RESERVOIR: Brand: CARDINAL HEALTH

## (undated) DEVICE — SUT MNCRYL PLS ANTIB UD 4/0 PS2 18IN

## (undated) DEVICE — UNDYED BRAIDED (POLYGLACTIN 910), SYNTHETIC ABSORBABLE SUTURE: Brand: COATED VICRYL

## (undated) DEVICE — 1010 S-DRAPE TOWEL DRAPE 10/BX: Brand: STERI-DRAPE™

## (undated) DEVICE — PATIENT RETURN ELECTRODE, SINGLE-USE, CONTACT QUALITY MONITORING, ADULT, WITH 9FT CORD, FOR PATIENTS WEIGING OVER 33LBS. (15KG): Brand: MEGADYNE

## (undated) DEVICE — SUT VIC 3/0 TIES 18IN J110T